# Patient Record
Sex: FEMALE | Race: WHITE | Employment: OTHER | URBAN - METROPOLITAN AREA
[De-identification: names, ages, dates, MRNs, and addresses within clinical notes are randomized per-mention and may not be internally consistent; named-entity substitution may affect disease eponyms.]

---

## 2017-06-02 RX ORDER — METHOCARBAMOL 750 MG/1
750 TABLET, FILM COATED ORAL 4 TIMES DAILY
Status: ON HOLD | COMMUNITY
End: 2021-10-22 | Stop reason: SDUPTHER

## 2017-06-02 RX ORDER — PANTOPRAZOLE SODIUM 40 MG/1
40 TABLET, DELAYED RELEASE ORAL DAILY
COMMUNITY

## 2017-06-02 RX ORDER — INSULIN GLARGINE 100 [IU]/ML
18 INJECTION, SOLUTION SUBCUTANEOUS
COMMUNITY
End: 2021-10-15

## 2017-06-02 RX ORDER — INDOMETHACIN 50 MG/1
50 CAPSULE ORAL 2 TIMES DAILY
COMMUNITY
End: 2018-05-03

## 2017-06-02 RX ORDER — PRAZOSIN HYDROCHLORIDE 5 MG/1
10 CAPSULE ORAL
COMMUNITY

## 2017-06-02 RX ORDER — GLUCOSAMINE SULFATE 1500 MG
1000 POWDER IN PACKET (EA) ORAL 2 TIMES DAILY
COMMUNITY

## 2017-06-02 RX ORDER — ALPRAZOLAM 0.25 MG/1
0.25 TABLET ORAL
COMMUNITY
End: 2018-05-03

## 2017-06-02 RX ORDER — ALPRAZOLAM 0.5 MG/1
0.5 TABLET ORAL
COMMUNITY
End: 2021-10-15

## 2017-06-02 RX ORDER — QUETIAPINE FUMARATE 200 MG/1
200 TABLET, FILM COATED ORAL
COMMUNITY
End: 2018-05-03

## 2017-06-02 RX ORDER — HYDROXYZINE PAMOATE 25 MG/1
25 CAPSULE ORAL
COMMUNITY
End: 2018-05-03

## 2017-06-02 RX ORDER — METFORMIN HYDROCHLORIDE 500 MG/1
500 TABLET ORAL 2 TIMES DAILY WITH MEALS
COMMUNITY
End: 2021-10-15

## 2017-06-02 RX ORDER — MEDROXYPROGESTERONE ACETATE 5 MG/1
5 TABLET ORAL 2 TIMES DAILY
COMMUNITY
End: 2017-06-09

## 2017-06-02 RX ORDER — CARBIDOPA AND LEVODOPA 25; 250 MG/1; MG/1
1 TABLET, ORALLY DISINTEGRATING ORAL
COMMUNITY
End: 2021-10-15

## 2017-06-02 RX ORDER — AMITRIPTYLINE HYDROCHLORIDE 50 MG/1
25 TABLET, FILM COATED ORAL
COMMUNITY
End: 2020-12-09

## 2017-06-02 RX ORDER — LORATADINE 10 MG/1
10 TABLET ORAL
COMMUNITY
End: 2017-06-09

## 2017-06-02 NOTE — PERIOP NOTES
TELEPHONE PRE-OP INTERVIEW: PRE-OP INSTRUCTIONS GIVEN, INCLUDING NPO AFTER MIDNIGHT, MEDICATIONS TO BRING DOS AND MEDICATIONS TO TAKE DOS. ALSO INCLUDED WERE MEDICATIONS TO STOP PRIOR TO SURGERY. PATIENT VOICED UNDERSTANDING OF SAME.

## 2017-06-08 ENCOUNTER — ANESTHESIA (OUTPATIENT)
Dept: SURGERY | Age: 47
End: 2017-06-08
Payer: OTHER GOVERNMENT

## 2017-06-08 ENCOUNTER — HOSPITAL ENCOUNTER (OUTPATIENT)
Age: 47
Setting detail: OBSERVATION
Discharge: HOME | End: 2017-06-09
Attending: OBSTETRICS & GYNECOLOGY | Admitting: OBSTETRICS & GYNECOLOGY
Payer: OTHER GOVERNMENT

## 2017-06-08 ENCOUNTER — ANESTHESIA EVENT (OUTPATIENT)
Dept: SURGERY | Age: 47
End: 2017-06-08
Payer: OTHER GOVERNMENT

## 2017-06-08 LAB
GLUCOSE BLD STRIP.AUTO-MCNC: 103 MG/DL (ref 65–100)
GLUCOSE BLD STRIP.AUTO-MCNC: 140 MG/DL (ref 65–100)
GLUCOSE BLD STRIP.AUTO-MCNC: 198 MG/DL (ref 65–100)
GLUCOSE BLD STRIP.AUTO-MCNC: 202 MG/DL (ref 65–100)
HCG UR QL: NEGATIVE
SERVICE CMNT-IMP: ABNORMAL

## 2017-06-08 PROCEDURE — 74011250636 HC RX REV CODE- 250/636: Performed by: OBSTETRICS & GYNECOLOGY

## 2017-06-08 PROCEDURE — 76010000881 HC OR TIME 4.5 TO 5HR INTENSV - TIER 2: Performed by: OBSTETRICS & GYNECOLOGY

## 2017-06-08 PROCEDURE — 77030035277 HC OBTRTR BLDELSS DISP INTU -B: Performed by: OBSTETRICS & GYNECOLOGY

## 2017-06-08 PROCEDURE — C1765 ADHESION BARRIER: HCPCS | Performed by: OBSTETRICS & GYNECOLOGY

## 2017-06-08 PROCEDURE — 77030020263 HC SOL INJ SOD CL0.9% LFCR 1000ML: Performed by: OBSTETRICS & GYNECOLOGY

## 2017-06-08 PROCEDURE — 77030032490 HC SLV COMPR SCD KNE COVD -B: Performed by: OBSTETRICS & GYNECOLOGY

## 2017-06-08 PROCEDURE — 77030008557 HC TBNG SMK EVAC STOR -B: Performed by: OBSTETRICS & GYNECOLOGY

## 2017-06-08 PROCEDURE — 77030011640 HC PAD GRND REM COVD -A: Performed by: OBSTETRICS & GYNECOLOGY

## 2017-06-08 PROCEDURE — 74011250636 HC RX REV CODE- 250/636: Performed by: ANESTHESIOLOGY

## 2017-06-08 PROCEDURE — 82962 GLUCOSE BLOOD TEST: CPT

## 2017-06-08 PROCEDURE — 77030008598 HC TRCR ENDOSC BLDLS J&J -B: Performed by: OBSTETRICS & GYNECOLOGY

## 2017-06-08 PROCEDURE — 74011250636 HC RX REV CODE- 250/636: Performed by: NURSE PRACTITIONER

## 2017-06-08 PROCEDURE — G0378 HOSPITAL OBSERVATION PER HR: HCPCS

## 2017-06-08 PROCEDURE — 74011250636 HC RX REV CODE- 250/636

## 2017-06-08 PROCEDURE — 77030008756 HC TU IRR SUC STRY -B: Performed by: OBSTETRICS & GYNECOLOGY

## 2017-06-08 PROCEDURE — 74011000250 HC RX REV CODE- 250: Performed by: ANESTHESIOLOGY

## 2017-06-08 PROCEDURE — 77030019927 HC TBNG IRR CYSTO BAXT -A: Performed by: OBSTETRICS & GYNECOLOGY

## 2017-06-08 PROCEDURE — 77030008684 HC TU ET CUF COVD -B: Performed by: ANESTHESIOLOGY

## 2017-06-08 PROCEDURE — 77030018836 HC SOL IRR NACL ICUM -A: Performed by: OBSTETRICS & GYNECOLOGY

## 2017-06-08 PROCEDURE — 74011000250 HC RX REV CODE- 250: Performed by: OBSTETRICS & GYNECOLOGY

## 2017-06-08 PROCEDURE — 77030005518 HC CATH URETH FOL 2W BARD -B: Performed by: OBSTETRICS & GYNECOLOGY

## 2017-06-08 PROCEDURE — 88307 TISSUE EXAM BY PATHOLOGIST: CPT | Performed by: OBSTETRICS & GYNECOLOGY

## 2017-06-08 PROCEDURE — 74011000250 HC RX REV CODE- 250

## 2017-06-08 PROCEDURE — 77030033271 HC TRCR ENDOSC EPATH2 J&J -B: Performed by: OBSTETRICS & GYNECOLOGY

## 2017-06-08 PROCEDURE — 77030018832 HC SOL IRR H20 ICUM -A: Performed by: OBSTETRICS & GYNECOLOGY

## 2017-06-08 PROCEDURE — 77030002925 HC SUT GORTX WLGO -C: Performed by: OBSTETRICS & GYNECOLOGY

## 2017-06-08 PROCEDURE — 77030013079 HC BLNKT BAIR HGGR 3M -A: Performed by: ANESTHESIOLOGY

## 2017-06-08 PROCEDURE — 77030037021 HC SLNG PELV MSH Y SHP BLU UPSYLON BSC -G: Performed by: OBSTETRICS & GYNECOLOGY

## 2017-06-08 PROCEDURE — 77030031139 HC SUT VCRL2 J&J -A: Performed by: OBSTETRICS & GYNECOLOGY

## 2017-06-08 PROCEDURE — 77030016151 HC PROTCTR LNS DFOG COVD -B: Performed by: OBSTETRICS & GYNECOLOGY

## 2017-06-08 PROCEDURE — 74011250637 HC RX REV CODE- 250/637: Performed by: NURSE PRACTITIONER

## 2017-06-08 PROCEDURE — 74011636637 HC RX REV CODE- 636/637: Performed by: NURSE PRACTITIONER

## 2017-06-08 PROCEDURE — 77030003580 HC NDL INSUF VERES J&J -B: Performed by: OBSTETRICS & GYNECOLOGY

## 2017-06-08 PROCEDURE — 76060000040 HC ANESTHESIA 4.5 TO 5 HR: Performed by: OBSTETRICS & GYNECOLOGY

## 2017-06-08 PROCEDURE — 77030002933 HC SUT MCRYL J&J -A: Performed by: OBSTETRICS & GYNECOLOGY

## 2017-06-08 PROCEDURE — 77030026438 HC STYL ET INTUB CARD -A: Performed by: ANESTHESIOLOGY

## 2017-06-08 PROCEDURE — 81025 URINE PREGNANCY TEST: CPT

## 2017-06-08 PROCEDURE — 99218 HC RM OBSERVATION: CPT

## 2017-06-08 PROCEDURE — C1771 REP DEV, URINARY, W/SLING: HCPCS | Performed by: OBSTETRICS & GYNECOLOGY

## 2017-06-08 PROCEDURE — 77030008771 HC TU NG SALEM SUMP -A: Performed by: ANESTHESIOLOGY

## 2017-06-08 PROCEDURE — 76210000017 HC OR PH I REC 1.5 TO 2 HR: Performed by: OBSTETRICS & GYNECOLOGY

## 2017-06-08 PROCEDURE — 77030020782 HC GWN BAIR PAWS FLX 3M -B

## 2017-06-08 PROCEDURE — 77030035488 HC SEAL UNIV DISP INTU -C: Performed by: OBSTETRICS & GYNECOLOGY

## 2017-06-08 PROCEDURE — 77030019908 HC STETH ESOPH SIMS -A: Performed by: ANESTHESIOLOGY

## 2017-06-08 PROCEDURE — 77030033202 HC SUT CAPRYSN COVD -A: Performed by: OBSTETRICS & GYNECOLOGY

## 2017-06-08 PROCEDURE — 74011250637 HC RX REV CODE- 250/637: Performed by: OBSTETRICS & GYNECOLOGY

## 2017-06-08 PROCEDURE — 77030010507 HC ADH SKN DERMBND J&J -B: Performed by: OBSTETRICS & GYNECOLOGY

## 2017-06-08 DEVICE — SLING FOR FEMALE INCONTINENCE
Type: IMPLANTABLE DEVICE | Site: PELVIS | Status: FUNCTIONAL
Brand: I-STOP

## 2017-06-08 DEVICE — TRADITIONAL Y MESH
Type: IMPLANTABLE DEVICE | Site: PELVIS | Status: FUNCTIONAL
Brand: UPSYLON™

## 2017-06-08 RX ORDER — FENTANYL CITRATE 50 UG/ML
50 INJECTION, SOLUTION INTRAMUSCULAR; INTRAVENOUS AS NEEDED
Status: DISCONTINUED | OUTPATIENT
Start: 2017-06-08 | End: 2017-06-08 | Stop reason: HOSPADM

## 2017-06-08 RX ORDER — SODIUM CHLORIDE 0.9 % (FLUSH) 0.9 %
5-10 SYRINGE (ML) INJECTION AS NEEDED
Status: DISCONTINUED | OUTPATIENT
Start: 2017-06-08 | End: 2017-06-08 | Stop reason: HOSPADM

## 2017-06-08 RX ORDER — DEXTROSE 50 % IN WATER (D50W) INTRAVENOUS SYRINGE
12.5-25 AS NEEDED
Status: DISCONTINUED | OUTPATIENT
Start: 2017-06-08 | End: 2017-06-08 | Stop reason: RX

## 2017-06-08 RX ORDER — SODIUM CHLORIDE 0.9 % (FLUSH) 0.9 %
5-10 SYRINGE (ML) INJECTION EVERY 8 HOURS
Status: DISCONTINUED | OUTPATIENT
Start: 2017-06-08 | End: 2017-06-09 | Stop reason: HOSPADM

## 2017-06-08 RX ORDER — OXYCODONE HYDROCHLORIDE 5 MG/1
5 TABLET ORAL AS NEEDED
Status: DISCONTINUED | OUTPATIENT
Start: 2017-06-08 | End: 2017-06-08 | Stop reason: HOSPADM

## 2017-06-08 RX ORDER — SODIUM CHLORIDE, SODIUM LACTATE, POTASSIUM CHLORIDE, CALCIUM CHLORIDE 600; 310; 30; 20 MG/100ML; MG/100ML; MG/100ML; MG/100ML
125 INJECTION, SOLUTION INTRAVENOUS CONTINUOUS
Status: DISCONTINUED | OUTPATIENT
Start: 2017-06-08 | End: 2017-06-09 | Stop reason: HOSPADM

## 2017-06-08 RX ORDER — ONDANSETRON 2 MG/ML
4 INJECTION INTRAMUSCULAR; INTRAVENOUS
Status: DISCONTINUED | OUTPATIENT
Start: 2017-06-08 | End: 2017-06-09 | Stop reason: HOSPADM

## 2017-06-08 RX ORDER — HYDROXYZINE 25 MG/1
25 TABLET, FILM COATED ORAL DAILY
Status: DISCONTINUED | OUTPATIENT
Start: 2017-06-09 | End: 2017-06-09 | Stop reason: HOSPADM

## 2017-06-08 RX ORDER — SODIUM CHLORIDE 9 MG/ML
25 INJECTION, SOLUTION INTRAVENOUS CONTINUOUS
Status: DISCONTINUED | OUTPATIENT
Start: 2017-06-08 | End: 2017-06-08 | Stop reason: HOSPADM

## 2017-06-08 RX ORDER — CARBIDOPA AND LEVODOPA 25; 100 MG/1; MG/1
1 TABLET, ORALLY DISINTEGRATING ORAL
Status: DISCONTINUED | OUTPATIENT
Start: 2017-06-08 | End: 2017-06-09 | Stop reason: HOSPADM

## 2017-06-08 RX ORDER — ROCURONIUM BROMIDE 10 MG/ML
INJECTION, SOLUTION INTRAVENOUS AS NEEDED
Status: DISCONTINUED | OUTPATIENT
Start: 2017-06-08 | End: 2017-06-08 | Stop reason: HOSPADM

## 2017-06-08 RX ORDER — DIPHENHYDRAMINE HYDROCHLORIDE 50 MG/ML
12.5 INJECTION, SOLUTION INTRAMUSCULAR; INTRAVENOUS
Status: DISCONTINUED | OUTPATIENT
Start: 2017-06-08 | End: 2017-06-09 | Stop reason: HOSPADM

## 2017-06-08 RX ORDER — DULOXETIN HYDROCHLORIDE 30 MG/1
90 CAPSULE, DELAYED RELEASE ORAL
Status: DISCONTINUED | OUTPATIENT
Start: 2017-06-08 | End: 2017-06-09 | Stop reason: HOSPADM

## 2017-06-08 RX ORDER — DEXTROSE MONOHYDRATE 100 MG/ML
125-250 INJECTION, SOLUTION INTRAVENOUS AS NEEDED
Status: DISCONTINUED | OUTPATIENT
Start: 2017-06-08 | End: 2017-06-09 | Stop reason: HOSPADM

## 2017-06-08 RX ORDER — DOCUSATE SODIUM 100 MG/1
100 CAPSULE, LIQUID FILLED ORAL 2 TIMES DAILY
Status: DISCONTINUED | OUTPATIENT
Start: 2017-06-09 | End: 2017-06-09 | Stop reason: HOSPADM

## 2017-06-08 RX ORDER — GLYCOPYRROLATE 0.2 MG/ML
INJECTION INTRAMUSCULAR; INTRAVENOUS AS NEEDED
Status: DISCONTINUED | OUTPATIENT
Start: 2017-06-08 | End: 2017-06-08 | Stop reason: HOSPADM

## 2017-06-08 RX ORDER — GABAPENTIN 600 MG/1
1200 TABLET ORAL 2 TIMES DAILY
Status: DISCONTINUED | OUTPATIENT
Start: 2017-06-09 | End: 2017-06-09 | Stop reason: HOSPADM

## 2017-06-08 RX ORDER — MIDAZOLAM HYDROCHLORIDE 1 MG/ML
0.5 INJECTION, SOLUTION INTRAMUSCULAR; INTRAVENOUS
Status: DISCONTINUED | OUTPATIENT
Start: 2017-06-08 | End: 2017-06-08 | Stop reason: HOSPADM

## 2017-06-08 RX ORDER — LIDOCAINE HYDROCHLORIDE 20 MG/ML
INJECTION, SOLUTION EPIDURAL; INFILTRATION; INTRACAUDAL; PERINEURAL AS NEEDED
Status: DISCONTINUED | OUTPATIENT
Start: 2017-06-08 | End: 2017-06-08 | Stop reason: HOSPADM

## 2017-06-08 RX ORDER — NEOSTIGMINE METHYLSULFATE 1 MG/ML
INJECTION INTRAVENOUS AS NEEDED
Status: DISCONTINUED | OUTPATIENT
Start: 2017-06-08 | End: 2017-06-08 | Stop reason: HOSPADM

## 2017-06-08 RX ORDER — FENTANYL CITRATE 50 UG/ML
INJECTION, SOLUTION INTRAMUSCULAR; INTRAVENOUS AS NEEDED
Status: DISCONTINUED | OUTPATIENT
Start: 2017-06-08 | End: 2017-06-08 | Stop reason: HOSPADM

## 2017-06-08 RX ORDER — PRAZOSIN HYDROCHLORIDE 5 MG/1
5 CAPSULE ORAL
Status: DISCONTINUED | OUTPATIENT
Start: 2017-06-08 | End: 2017-06-09 | Stop reason: HOSPADM

## 2017-06-08 RX ORDER — BUPIVACAINE HYDROCHLORIDE AND EPINEPHRINE 5; 5 MG/ML; UG/ML
INJECTION, SOLUTION EPIDURAL; INTRACAUDAL; PERINEURAL AS NEEDED
Status: DISCONTINUED | OUTPATIENT
Start: 2017-06-08 | End: 2017-06-08 | Stop reason: HOSPADM

## 2017-06-08 RX ORDER — LIDOCAINE HYDROCHLORIDE 10 MG/ML
0.1 INJECTION, SOLUTION EPIDURAL; INFILTRATION; INTRACAUDAL; PERINEURAL AS NEEDED
Status: DISCONTINUED | OUTPATIENT
Start: 2017-06-08 | End: 2017-06-08 | Stop reason: HOSPADM

## 2017-06-08 RX ORDER — FUROSEMIDE 10 MG/ML
INJECTION INTRAMUSCULAR; INTRAVENOUS AS NEEDED
Status: DISCONTINUED | OUTPATIENT
Start: 2017-06-08 | End: 2017-06-08 | Stop reason: HOSPADM

## 2017-06-08 RX ORDER — MIDAZOLAM HYDROCHLORIDE 1 MG/ML
INJECTION, SOLUTION INTRAMUSCULAR; INTRAVENOUS AS NEEDED
Status: DISCONTINUED | OUTPATIENT
Start: 2017-06-08 | End: 2017-06-08 | Stop reason: HOSPADM

## 2017-06-08 RX ORDER — ROPIVACAINE HYDROCHLORIDE 5 MG/ML
30 INJECTION, SOLUTION EPIDURAL; INFILTRATION; PERINEURAL AS NEEDED
Status: DISCONTINUED | OUTPATIENT
Start: 2017-06-08 | End: 2017-06-08 | Stop reason: HOSPADM

## 2017-06-08 RX ORDER — NALOXONE HYDROCHLORIDE 0.4 MG/ML
0.4 INJECTION, SOLUTION INTRAMUSCULAR; INTRAVENOUS; SUBCUTANEOUS AS NEEDED
Status: DISCONTINUED | OUTPATIENT
Start: 2017-06-08 | End: 2017-06-09 | Stop reason: HOSPADM

## 2017-06-08 RX ORDER — HYDROMORPHONE HYDROCHLORIDE 1 MG/ML
0.2 INJECTION, SOLUTION INTRAMUSCULAR; INTRAVENOUS; SUBCUTANEOUS
Status: DISCONTINUED | OUTPATIENT
Start: 2017-06-08 | End: 2017-06-08 | Stop reason: HOSPADM

## 2017-06-08 RX ORDER — SODIUM CHLORIDE, SODIUM LACTATE, POTASSIUM CHLORIDE, CALCIUM CHLORIDE 600; 310; 30; 20 MG/100ML; MG/100ML; MG/100ML; MG/100ML
100 INJECTION, SOLUTION INTRAVENOUS CONTINUOUS
Status: DISCONTINUED | OUTPATIENT
Start: 2017-06-08 | End: 2017-06-08 | Stop reason: HOSPADM

## 2017-06-08 RX ORDER — HYDROMORPHONE HYDROCHLORIDE 1 MG/ML
INJECTION, SOLUTION INTRAMUSCULAR; INTRAVENOUS; SUBCUTANEOUS AS NEEDED
Status: DISCONTINUED | OUTPATIENT
Start: 2017-06-08 | End: 2017-06-08 | Stop reason: HOSPADM

## 2017-06-08 RX ORDER — ONDANSETRON 2 MG/ML
INJECTION INTRAMUSCULAR; INTRAVENOUS AS NEEDED
Status: DISCONTINUED | OUTPATIENT
Start: 2017-06-08 | End: 2017-06-08 | Stop reason: HOSPADM

## 2017-06-08 RX ORDER — MORPHINE SULFATE 10 MG/ML
2 INJECTION, SOLUTION INTRAMUSCULAR; INTRAVENOUS
Status: DISCONTINUED | OUTPATIENT
Start: 2017-06-08 | End: 2017-06-08 | Stop reason: HOSPADM

## 2017-06-08 RX ORDER — FAMOTIDINE 20 MG/1
20 TABLET, FILM COATED ORAL 2 TIMES DAILY
Status: DISCONTINUED | OUTPATIENT
Start: 2017-06-08 | End: 2017-06-09 | Stop reason: HOSPADM

## 2017-06-08 RX ORDER — SODIUM CHLORIDE, SODIUM LACTATE, POTASSIUM CHLORIDE, CALCIUM CHLORIDE 600; 310; 30; 20 MG/100ML; MG/100ML; MG/100ML; MG/100ML
25 INJECTION, SOLUTION INTRAVENOUS CONTINUOUS
Status: DISCONTINUED | OUTPATIENT
Start: 2017-06-08 | End: 2017-06-08 | Stop reason: HOSPADM

## 2017-06-08 RX ORDER — CEFAZOLIN SODIUM IN 0.9 % NACL 2 G/100 ML
PLASTIC BAG, INJECTION (ML) INTRAVENOUS AS NEEDED
Status: DISCONTINUED | OUTPATIENT
Start: 2017-06-08 | End: 2017-06-08 | Stop reason: HOSPADM

## 2017-06-08 RX ORDER — INSULIN LISPRO 100 [IU]/ML
2 INJECTION, SOLUTION INTRAVENOUS; SUBCUTANEOUS ONCE
Status: COMPLETED | OUTPATIENT
Start: 2017-06-08 | End: 2017-06-08

## 2017-06-08 RX ORDER — SODIUM CHLORIDE 0.9 % (FLUSH) 0.9 %
5-10 SYRINGE (ML) INJECTION EVERY 8 HOURS
Status: DISCONTINUED | OUTPATIENT
Start: 2017-06-08 | End: 2017-06-08 | Stop reason: HOSPADM

## 2017-06-08 RX ORDER — LIDOCAINE HYDROCHLORIDE AND EPINEPHRINE 5; 5 MG/ML; UG/ML
INJECTION, SOLUTION INFILTRATION; PERINEURAL AS NEEDED
Status: DISCONTINUED | OUTPATIENT
Start: 2017-06-08 | End: 2017-06-08 | Stop reason: HOSPADM

## 2017-06-08 RX ORDER — METRONIDAZOLE 7.5 MG/G
GEL VAGINAL AS NEEDED
Status: DISCONTINUED | OUTPATIENT
Start: 2017-06-08 | End: 2017-06-08 | Stop reason: HOSPADM

## 2017-06-08 RX ORDER — HYDROMORPHONE HCL IN 0.9% NACL 15 MG/30ML
PATIENT CONTROLLED ANALGESIA VIAL INTRAVENOUS CONTINUOUS
Status: DISCONTINUED | OUTPATIENT
Start: 2017-06-08 | End: 2017-06-09

## 2017-06-08 RX ORDER — PROPOFOL 10 MG/ML
INJECTION, EMULSION INTRAVENOUS AS NEEDED
Status: DISCONTINUED | OUTPATIENT
Start: 2017-06-08 | End: 2017-06-08 | Stop reason: HOSPADM

## 2017-06-08 RX ORDER — ONDANSETRON 2 MG/ML
4 INJECTION INTRAMUSCULAR; INTRAVENOUS AS NEEDED
Status: DISCONTINUED | OUTPATIENT
Start: 2017-06-08 | End: 2017-06-08 | Stop reason: HOSPADM

## 2017-06-08 RX ORDER — DIPHENHYDRAMINE HYDROCHLORIDE 50 MG/ML
12.5 INJECTION, SOLUTION INTRAMUSCULAR; INTRAVENOUS AS NEEDED
Status: DISCONTINUED | OUTPATIENT
Start: 2017-06-08 | End: 2017-06-08 | Stop reason: HOSPADM

## 2017-06-08 RX ORDER — MIDAZOLAM HYDROCHLORIDE 1 MG/ML
1 INJECTION, SOLUTION INTRAMUSCULAR; INTRAVENOUS AS NEEDED
Status: DISCONTINUED | OUTPATIENT
Start: 2017-06-08 | End: 2017-06-08 | Stop reason: HOSPADM

## 2017-06-08 RX ORDER — INSULIN LISPRO 100 [IU]/ML
INJECTION, SOLUTION INTRAVENOUS; SUBCUTANEOUS
Status: DISCONTINUED | OUTPATIENT
Start: 2017-06-08 | End: 2017-06-09 | Stop reason: HOSPADM

## 2017-06-08 RX ORDER — SUCCINYLCHOLINE CHLORIDE 20 MG/ML
INJECTION INTRAMUSCULAR; INTRAVENOUS AS NEEDED
Status: DISCONTINUED | OUTPATIENT
Start: 2017-06-08 | End: 2017-06-08 | Stop reason: HOSPADM

## 2017-06-08 RX ORDER — SODIUM CHLORIDE 0.9 % (FLUSH) 0.9 %
5-10 SYRINGE (ML) INJECTION AS NEEDED
Status: DISCONTINUED | OUTPATIENT
Start: 2017-06-08 | End: 2017-06-09 | Stop reason: HOSPADM

## 2017-06-08 RX ORDER — FENTANYL CITRATE 50 UG/ML
25 INJECTION, SOLUTION INTRAMUSCULAR; INTRAVENOUS
Status: COMPLETED | OUTPATIENT
Start: 2017-06-08 | End: 2017-06-08

## 2017-06-08 RX ORDER — MAGNESIUM SULFATE 100 %
4 CRYSTALS MISCELLANEOUS AS NEEDED
Status: DISCONTINUED | OUTPATIENT
Start: 2017-06-08 | End: 2017-06-09 | Stop reason: HOSPADM

## 2017-06-08 RX ORDER — HYDROXYZINE 25 MG/1
75 TABLET, FILM COATED ORAL
Status: DISCONTINUED | OUTPATIENT
Start: 2017-06-08 | End: 2017-06-09 | Stop reason: HOSPADM

## 2017-06-08 RX ADMIN — ROCURONIUM BROMIDE 5 MG: 10 INJECTION, SOLUTION INTRAVENOUS at 09:21

## 2017-06-08 RX ADMIN — SODIUM CHLORIDE, SODIUM LACTATE, POTASSIUM CHLORIDE, AND CALCIUM CHLORIDE 125 ML/HR: 600; 310; 30; 20 INJECTION, SOLUTION INTRAVENOUS at 15:41

## 2017-06-08 RX ADMIN — HYDROMORPHONE HYDROCHLORIDE 0.5 MG: 1 INJECTION, SOLUTION INTRAMUSCULAR; INTRAVENOUS; SUBCUTANEOUS at 15:11

## 2017-06-08 RX ADMIN — HYDROMORPHONE HYDROCHLORIDE 0.5 MG: 1 INJECTION, SOLUTION INTRAMUSCULAR; INTRAVENOUS; SUBCUTANEOUS at 10:27

## 2017-06-08 RX ADMIN — SODIUM CHLORIDE, SODIUM LACTATE, POTASSIUM CHLORIDE, AND CALCIUM CHLORIDE 25 ML/HR: 600; 310; 30; 20 INJECTION, SOLUTION INTRAVENOUS at 08:50

## 2017-06-08 RX ADMIN — FENTANYL CITRATE 50 MCG: 50 INJECTION, SOLUTION INTRAMUSCULAR; INTRAVENOUS at 09:09

## 2017-06-08 RX ADMIN — ROCURONIUM BROMIDE 30 MG: 10 INJECTION, SOLUTION INTRAVENOUS at 10:52

## 2017-06-08 RX ADMIN — Medication 10 ML: at 17:00

## 2017-06-08 RX ADMIN — FENTANYL CITRATE 50 MCG: 50 INJECTION, SOLUTION INTRAMUSCULAR; INTRAVENOUS at 11:51

## 2017-06-08 RX ADMIN — FENTANYL CITRATE 25 MCG: 50 INJECTION, SOLUTION INTRAMUSCULAR; INTRAVENOUS at 14:45

## 2017-06-08 RX ADMIN — MEPERIDINE HYDROCHLORIDE 25 MG: 25 INJECTION INTRAMUSCULAR; INTRAVENOUS; SUBCUTANEOUS at 14:23

## 2017-06-08 RX ADMIN — Medication: at 15:16

## 2017-06-08 RX ADMIN — SODIUM CHLORIDE, SODIUM LACTATE, POTASSIUM CHLORIDE, AND CALCIUM CHLORIDE: 600; 310; 30; 20 INJECTION, SOLUTION INTRAVENOUS at 12:25

## 2017-06-08 RX ADMIN — DIPHENHYDRAMINE HYDROCHLORIDE 12.5 MG: 50 INJECTION, SOLUTION INTRAMUSCULAR; INTRAVENOUS at 17:12

## 2017-06-08 RX ADMIN — LIDOCAINE HYDROCHLORIDE 80 MG: 20 INJECTION, SOLUTION EPIDURAL; INFILTRATION; INTRACAUDAL; PERINEURAL at 09:19

## 2017-06-08 RX ADMIN — ROCURONIUM BROMIDE 35 MG: 10 INJECTION, SOLUTION INTRAVENOUS at 09:40

## 2017-06-08 RX ADMIN — FENTANYL CITRATE 25 MCG: 50 INJECTION, SOLUTION INTRAMUSCULAR; INTRAVENOUS at 14:30

## 2017-06-08 RX ADMIN — HYDROMORPHONE HYDROCHLORIDE 0.25 MG: 1 INJECTION, SOLUTION INTRAMUSCULAR; INTRAVENOUS; SUBCUTANEOUS at 13:18

## 2017-06-08 RX ADMIN — HYDROMORPHONE HYDROCHLORIDE 0.25 MG: 1 INJECTION, SOLUTION INTRAMUSCULAR; INTRAVENOUS; SUBCUTANEOUS at 12:11

## 2017-06-08 RX ADMIN — FUROSEMIDE 10 MG: 10 INJECTION INTRAMUSCULAR; INTRAVENOUS at 12:58

## 2017-06-08 RX ADMIN — FENTANYL CITRATE 50 MCG: 50 INJECTION, SOLUTION INTRAMUSCULAR; INTRAVENOUS at 09:21

## 2017-06-08 RX ADMIN — FENTANYL CITRATE 50 MCG: 50 INJECTION, SOLUTION INTRAMUSCULAR; INTRAVENOUS at 10:22

## 2017-06-08 RX ADMIN — SUCCINYLCHOLINE CHLORIDE 160 MG: 20 INJECTION INTRAMUSCULAR; INTRAVENOUS at 09:21

## 2017-06-08 RX ADMIN — FENTANYL CITRATE 50 MCG: 50 INJECTION, SOLUTION INTRAMUSCULAR; INTRAVENOUS at 13:59

## 2017-06-08 RX ADMIN — FENTANYL CITRATE 25 MCG: 50 INJECTION, SOLUTION INTRAMUSCULAR; INTRAVENOUS at 14:22

## 2017-06-08 RX ADMIN — FENTANYL CITRATE 50 MCG: 50 INJECTION, SOLUTION INTRAMUSCULAR; INTRAVENOUS at 10:30

## 2017-06-08 RX ADMIN — PROPOFOL 150 MG: 10 INJECTION, EMULSION INTRAVENOUS at 09:21

## 2017-06-08 RX ADMIN — SODIUM CHLORIDE, SODIUM LACTATE, POTASSIUM CHLORIDE, AND CALCIUM CHLORIDE 25 ML/HR: 600; 310; 30; 20 INJECTION, SOLUTION INTRAVENOUS at 09:00

## 2017-06-08 RX ADMIN — ROCURONIUM BROMIDE 5 MG: 10 INJECTION, SOLUTION INTRAVENOUS at 11:51

## 2017-06-08 RX ADMIN — FENTANYL CITRATE 25 MCG: 50 INJECTION, SOLUTION INTRAMUSCULAR; INTRAVENOUS at 15:00

## 2017-06-08 RX ADMIN — Medication 10 ML: at 22:00

## 2017-06-08 RX ADMIN — GLYCOPYRROLATE 0.6 MG: 0.2 INJECTION INTRAMUSCULAR; INTRAVENOUS at 13:29

## 2017-06-08 RX ADMIN — ONDANSETRON 4 MG: 2 INJECTION INTRAMUSCULAR; INTRAVENOUS at 13:00

## 2017-06-08 RX ADMIN — DULOXETINE HYDROCHLORIDE 90 MG: 30 CAPSULE, DELAYED RELEASE ORAL at 23:35

## 2017-06-08 RX ADMIN — FAMOTIDINE 20 MG: 20 TABLET ORAL at 23:34

## 2017-06-08 RX ADMIN — NEOSTIGMINE METHYLSULFATE 4 MG: 1 INJECTION INTRAVENOUS at 13:29

## 2017-06-08 RX ADMIN — LIDOCAINE HYDROCHLORIDE 0.1 ML: 10 INJECTION, SOLUTION EPIDURAL; INFILTRATION; INTRACAUDAL; PERINEURAL at 08:50

## 2017-06-08 RX ADMIN — ROCURONIUM BROMIDE 10 MG: 10 INJECTION, SOLUTION INTRAVENOUS at 10:22

## 2017-06-08 RX ADMIN — INSULIN LISPRO 2 UNITS: 100 INJECTION, SOLUTION INTRAVENOUS; SUBCUTANEOUS at 18:35

## 2017-06-08 RX ADMIN — MIDAZOLAM HYDROCHLORIDE 3 MG: 1 INJECTION, SOLUTION INTRAMUSCULAR; INTRAVENOUS at 09:09

## 2017-06-08 RX ADMIN — ROCURONIUM BROMIDE 10 MG: 10 INJECTION, SOLUTION INTRAVENOUS at 12:32

## 2017-06-08 RX ADMIN — MIDAZOLAM HYDROCHLORIDE 2 MG: 1 INJECTION, SOLUTION INTRAMUSCULAR; INTRAVENOUS at 09:13

## 2017-06-08 RX ADMIN — HYDROXYZINE HYDROCHLORIDE 75 MG: 25 TABLET, FILM COATED ORAL at 23:35

## 2017-06-08 RX ADMIN — Medication 2 G: at 10:20

## 2017-06-08 NOTE — PERIOP NOTES
TRANSFER - OUT REPORT:  s  Verbal report given to [facility] on Eveline Calderon  being transferred to [facility] for routine post - op       Report consisted of patients Situation, Background, Assessment and   Recommendations(SBAR). Time Pre op antibiotic given:See MAR  Anesthesia Stop time: [id number]  Greene Present on Transfer to floor: Maxine Greene on Chart:Ye    Information from the following report(s) SBAR, OR Summary, Procedure Summary, Intake/Output and MAR was reviewed with the receiving nurse. Opportunity for questions and clarification was provided. Is the patient on 02? NO       L/Min RA       Other None    Is the patient on a monitor? NO    Is the nurse transporting with the patient? NO    Surgical Waiting Area notified of patient's transfer from PACU?  (Pt said:  She has no Family Waiting); but I will call to clarify.       The following personal items collected during your admission accompanied patient upon transfer:   Dental Appliance: Dental Appliances: Partials, Uppers (partials removed by pt)  Vision: Visual Aid: Glasses  Hearing Aid:    Jewelry:    Clothing: Clothing:  (small luggage bag, blanket, clothing, glasses to pacu; large pillow to OR w/ pt)  Other Valuables:    Valuables sent to safe:

## 2017-06-08 NOTE — IP AVS SNAPSHOT
[address] 
[phone] Patient: Panda Warren MRN: [id number] WRN:[id number] You are allergic to the following Allergen Reactions Wellbutrin (Bupropion Hcl) Seizures Sulfa (Sulfonamide Antibiotics) Hives Recent Documentation Height Weight BMI OB Status Smoking Status 1.702 m 106.6 kg 36.81 kg/m2 Having regular periods Never Smoker Unresulted Labs Order Current Status SAMPLE TO BLOOD BANK In process Emergency Contacts Name Discharge Info Relation Home Work Mobile Danyell Lenz DISCHARGE CAREGIVER [3] Mother [14]   [phone] About your hospitalization You were admitted on:  June 8, 2017 You last received care in the:  [address] You were discharged on:  June 9, 2017 Unit phone number:  [phone] Why you were hospitalized Your primary diagnosis was:  Not on File Providers Seen During Your Hospitalizations Provider Role Specialty Primary office phone Rodrigo Kirkpatrick MD Attending Provider Obstetrics & Gynecology [phone] Your Primary Care Physician (PCP) Primary Care Physician Office Phone Office Fax OTHER, PHYS ** None ** ** None ** Follow-up Information Follow up With Details Comments Contact Info Sofie Rebolledo MD   Patient can only remember the practice name and not the physician Current Discharge Medication List  
  
START taking these medications Dose & Instructions Dispensing Information Comments Morning Noon Evening Bedtime  
 docusate sodium 100 mg capsule Commonly known as:  Tamar Poole Your last dose was: Your next dose is:    
   
   
 Dose:  100 mg Take 1 Cap by mouth two (2) times a day. Quantity:  90 Cap Refills:  0 HYDROcodone-acetaminophen  mg tablet Commonly known as:  Kenzie Buchanan  
 Your last dose was: Your next dose is:    
   
   
 Dose:  1-2 Tab Take 1-2 Tabs by mouth every four (4) hours as needed. Max Daily Amount: 12 Tabs. Quantity:  60 Tab Refills:  0  
     
   
   
   
  
 lactulose 20 gram/30 mL Soln solution Commonly known as:  Gabe Ilia Your last dose was: Your next dose is:    
   
   
 Dose:  20 g Take 30 mL by mouth three (3) times daily as needed. Quantity:  1 Bottle Refills:  1  
     
   
   
   
  
 nitrofurantoin (macrocrystal-monohydrate) 100 mg capsule Commonly known as:  MACROBID Your last dose was: Your next dose is:    
   
   
 Dose:  100 mg Take 1 Cap by mouth daily. Quantity:  10 Cap Refills:  0  
     
   
   
   
  
 ondansetron 8 mg disintegrating tablet Commonly known as:  ZOFRAN ODT Your last dose was: Your next dose is:    
   
   
 Dose:  8 mg Take 1 Tab by mouth every eight (8) hours as needed for Nausea. Quantity:  12 Tab Refills:  0 CONTINUE these medications which have NOT CHANGED Dose & Instructions Dispensing Information Comments Morning Noon Evening Bedtime * ALPRAZolam 0.5 mg tablet Commonly known as:  Juan R Arevalo Your last dose was: Your next dose is:    
   
   
 Dose:  0.5 mg Take 0.5 mg by mouth nightly. Refills:  0  
     
   
   
   
  
 * ALPRAZolam 0.25 mg tablet Commonly known as:  Juan R Arevalo Your last dose was: Your next dose is:    
   
   
 Dose:  0.25 mg Take 0.25 mg by mouth every morning. Refills:  0  
     
   
   
   
  
 amitriptyline 50 mg tablet Commonly known as:  ELAVIL Your last dose was: Your next dose is:    
   
   
 Dose:  50 mg Take 50 mg by mouth nightly. Refills:  0  
     
   
   
   
  
 carbidopa-levodopa  mg rapid dissolve tablet Commonly known as:  PARCOPA Your last dose was: Your next dose is: Dose:  1 Tab Take 1 Tab by mouth nightly. Refills:  0  
     
   
   
   
  
 DULOXETINE PO Your last dose was: Your next dose is:    
   
   
 Dose:  90 mg Take 90 mg by mouth nightly. Refills:  0  
     
   
   
   
  
 GABAPENTIN PO Your last dose was: Your next dose is:    
   
   
 Dose:  1200 mg Take 1,200 mg by mouth two (2) times a day. Refills:  0  
     
   
   
   
  
 indomethacin 50 mg capsule Commonly known as:  INDOCIN Your last dose was: Your next dose is:    
   
   
 Dose:  50 mg Take 50 mg by mouth two (2) times a day. Refills:  0  
     
   
   
   
  
 LANTUS 100 unit/mL injection Generic drug:  insulin glargine Your last dose was: Your next dose is:    
   
   
 Dose:  18 Units 18 Units by SubCUTAneous route every morning. Refills:  0  
     
   
   
   
  
 metFORMIN 500 mg tablet Commonly known as:  GLUCOPHAGE Your last dose was: Your next dose is:    
   
   
 Dose:  500 mg Take 500 mg by mouth two (2) times daily (with meals). Refills:  0  
     
   
   
   
  
 methocarbamol 750 mg tablet Commonly known as:  ROBAXIN Your last dose was: Your next dose is:    
   
   
 Dose:  750 mg Take 750 mg by mouth three (3) times daily. Refills:  0  
     
   
   
   
  
 pantoprazole 40 mg tablet Commonly known as:  PROTONIX Your last dose was: Your next dose is:    
   
   
 Dose:  40 mg Take 40 mg by mouth daily. Refills:  0  
     
   
   
   
  
 prazosin 5 mg capsule Commonly known as:  MINIPRESS Your last dose was: Your next dose is:    
   
   
 Dose:  5 mg Take 5 mg by mouth nightly. Refills:  0  
     
   
   
   
  
 RANITIDINE HCL PO Your last dose was: Your next dose is:    
   
   
 Dose:  150 mg Take 150 mg by mouth two (2) times a day. Refills:  0 SEROquel 200 mg tablet Generic drug:  QUEtiapine Your last dose was: Your next dose is:    
   
   
 Dose:  200 mg Take 200 mg by mouth nightly. Refills:  0  
     
   
   
   
  
 * VISTARIL 25 mg capsule Generic drug:  hydrOXYzine pamoate Your last dose was: Your next dose is:    
   
   
 Dose:  25 mg Take 25 mg by mouth every morning. Refills:  0  
     
   
   
   
  
 * VISTARIL PO Your last dose was: Your next dose is:    
   
   
 Dose:  75 mg Take 75 mg by mouth nightly. Refills:  0  
     
   
   
   
  
 VITAMIN D3 1,000 unit Cap Generic drug:  cholecalciferol Your last dose was: Your next dose is:    
   
   
 Dose:  1000 Units Take 1,000 Units by mouth daily. Refills:  0  
     
   
   
   
  
 * Notice: This list has 4 medication(s) that are the same as other medications prescribed for you. Read the directions carefully, and ask your doctor or other care provider to review them with you. STOP taking these medications   
 loratadine 10 mg tablet Commonly known as:  CLARITIN  
   
  
 medroxyPROGESTERone 5 mg tablet Commonly known as:  PROVERA Where to Get Your Medications Information on where to get these meds will be given to you by the nurse or doctor. ! Ask your nurse or doctor about these medications  
  docusate sodium 100 mg capsule HYDROcodone-acetaminophen  mg tablet  
 lactulose 20 gram/30 mL Soln solution  
 nitrofurantoin (macrocrystal-monohydrate) 100 mg capsule  
 ondansetron 8 mg disintegrating tablet Discharge Instructions Discharge Orders None [facility] Announcement We are excited to announce that we are making your provider's discharge notes available to you in [facility]. You will see these notes when they are completed and signed by the physician that discharged you from your recent hospital stay.   If you have any questions or concerns about any information you see in Askem, please call the Health Information Department where you were seen or reach out to your Primary Care Provider for more information about your plan of care. Introducing hospitals & [facility] SERVICES! Chato Hudson introduces Askem patient portal. Now you can access parts of your medical record, email your doctor's office, and request medication refills online. 1. In your internet browser, go to [email or web address] 2. Click on the First Time User? Click Here link in the Sign In box. You will see the New Member Sign Up page. 3. Enter your Askem Access Code exactly as it appears below. You will not need to use this code after youve completed the sign-up process. If you do not sign up before the expiration date, you must request a new code. · Askem Access Code: [email or web address] Expires: 9/7/2017  2:46 PM 
 
4. Enter the last four digits of your Social Security Number (xxxx) and Date of Birth (mm/dd/yyyy) as indicated and click Submit. You will be taken to the next sign-up page. 5. Create a Askem ID. This will be your Askem login ID and cannot be changed, so think of one that is secure and easy to remember. 6. Create a Askem password. You can change your password at any time. 7. Enter your Password Reset Question and Answer. This can be used at a later time if you forget your password. 8. Enter your e-mail address. You will receive e-mail notification when new information is available in [address] 9. Click Sign Up. You can now view and download portions of your medical record. 10. Click the Download Summary menu link to download a portable copy of your medical information. If you have questions, please visit the Frequently Asked Questions section of the Askem website. Remember, Askem is NOT to be used for urgent needs. For medical emergencies, dial 911. Now available from your iPhone and Android! General Information Please provide this summary of care documentation to your next provider. Patient Signature:  ____________________________________________________________ Date:  ____________________________________________________________  
  
Christia Sicilian Provider Signature:  ____________________________________________________________ Date:  ____________________________________________________________

## 2017-06-08 NOTE — PERIOP NOTES
0900  Report given to S. Camille Dubin from Brody Johnson RN. Gynecare Interceed Absorbable Adhesion Barrier 3 in X 4 in present on sterile field for use by surgeon. Lot [id number], exp 12/31/20.    0915  1000 mL 0.9% NaCl to top sterile field and 1000 mL 0.9% NaCl to bottom sterile field. 0925  1000 mL 0.9% NaCl connected to sterile suction . 1024  Surgical procedure started; patient requested in holding to update family only at the end of the procedure per Brody Johnson RN.    1208  Everett AH (Absorbable Hemostatic Particles) 3 grams to sterile field for use by surgeon. Lot [id number], exp 01/28/2022. Used at this time. 1228  Interceed used. 1310  1000 mL sterile water connected to sterile tubing for cystoscopy. 1330  Confirmed procedure and diagnosis with surgeon; verified only one specimen and specimen name with surgeon.

## 2017-06-08 NOTE — IP AVS SNAPSHOT
Current Discharge Medication List  
  
START taking these medications Dose & Instructions Dispensing Information Comments Morning Noon Evening Bedtime  
 docusate sodium 100 mg capsule Commonly known as:  Racielvalentina Hernandez Your last dose was: Your next dose is:    
   
   
 Dose:  100 mg Take 1 Cap by mouth two (2) times a day. Quantity:  90 Cap Refills:  0 HYDROcodone-acetaminophen  mg tablet Commonly known as:  Krcolten Debra Your last dose was: Your next dose is:    
   
   
 Dose:  1-2 Tab Take 1-2 Tabs by mouth every four (4) hours as needed. Max Daily Amount: 12 Tabs. Quantity:  60 Tab Refills:  0  
     
   
   
   
  
 lactulose 20 gram/30 mL Soln solution Commonly known as:  Alondra Gonzales Your last dose was: Your next dose is:    
   
   
 Dose:  20 g Take 30 mL by mouth three (3) times daily as needed. Quantity:  1 Bottle Refills:  1  
     
   
   
   
  
 nitrofurantoin (macrocrystal-monohydrate) 100 mg capsule Commonly known as:  MACROBID Your last dose was: Your next dose is:    
   
   
 Dose:  100 mg Take 1 Cap by mouth daily. Quantity:  10 Cap Refills:  0  
     
   
   
   
  
 ondansetron 8 mg disintegrating tablet Commonly known as:  ZOFRAN ODT Your last dose was: Your next dose is:    
   
   
 Dose:  8 mg Take 1 Tab by mouth every eight (8) hours as needed for Nausea. Quantity:  12 Tab Refills:  0 CONTINUE these medications which have NOT CHANGED Dose & Instructions Dispensing Information Comments Morning Noon Evening Bedtime * ALPRAZolam 0.5 mg tablet Commonly known as:  Gisele Sparrow Your last dose was: Your next dose is:    
   
   
 Dose:  0.5 mg Take 0.5 mg by mouth nightly. Refills:  0  
     
   
   
   
  
 * ALPRAZolam 0.25 mg tablet Commonly known as:  Gisele Sparrow Your last dose was: Your next dose is:    
   
   
 Dose:  0.25 mg Take 0.25 mg by mouth every morning. Refills:  0  
     
   
   
   
  
 amitriptyline 50 mg tablet Commonly known as:  ELAVIL Your last dose was: Your next dose is:    
   
   
 Dose:  50 mg Take 50 mg by mouth nightly. Refills:  0  
     
   
   
   
  
 carbidopa-levodopa  mg rapid dissolve tablet Commonly known as:  PARCOPA Your last dose was: Your next dose is:    
   
   
 Dose:  1 Tab Take 1 Tab by mouth nightly. Refills:  0  
     
   
   
   
  
 DULOXETINE PO Your last dose was: Your next dose is:    
   
   
 Dose:  90 mg Take 90 mg by mouth nightly. Refills:  0  
     
   
   
   
  
 GABAPENTIN PO Your last dose was: Your next dose is:    
   
   
 Dose:  1200 mg Take 1,200 mg by mouth two (2) times a day. Refills:  0  
     
   
   
   
  
 indomethacin 50 mg capsule Commonly known as:  INDOCIN Your last dose was: Your next dose is:    
   
   
 Dose:  50 mg Take 50 mg by mouth two (2) times a day. Refills:  0  
     
   
   
   
  
 LANTUS 100 unit/mL injection Generic drug:  insulin glargine Your last dose was: Your next dose is:    
   
   
 Dose:  18 Units 18 Units by SubCUTAneous route every morning. Refills:  0  
     
   
   
   
  
 metFORMIN 500 mg tablet Commonly known as:  GLUCOPHAGE Your last dose was: Your next dose is:    
   
   
 Dose:  500 mg Take 500 mg by mouth two (2) times daily (with meals). Refills:  0  
     
   
   
   
  
 methocarbamol 750 mg tablet Commonly known as:  ROBAXIN Your last dose was: Your next dose is:    
   
   
 Dose:  750 mg Take 750 mg by mouth three (3) times daily. Refills:  0  
     
   
   
   
  
 pantoprazole 40 mg tablet Commonly known as:  PROTONIX Your last dose was:     
   
Your next dose is:    
   
   
 Dose:  40 mg  
 Take 40 mg by mouth daily. Refills:  0  
     
   
   
   
  
 prazosin 5 mg capsule Commonly known as:  MINIPRESS Your last dose was: Your next dose is:    
   
   
 Dose:  5 mg Take 5 mg by mouth nightly. Refills:  0  
     
   
   
   
  
 RANITIDINE HCL PO Your last dose was: Your next dose is:    
   
   
 Dose:  150 mg Take 150 mg by mouth two (2) times a day. Refills:  0 SEROquel 200 mg tablet Generic drug:  QUEtiapine Your last dose was: Your next dose is:    
   
   
 Dose:  200 mg Take 200 mg by mouth nightly. Refills:  0  
     
   
   
   
  
 * VISTARIL 25 mg capsule Generic drug:  hydrOXYzine pamoate Your last dose was: Your next dose is:    
   
   
 Dose:  25 mg Take 25 mg by mouth every morning. Refills:  0  
     
   
   
   
  
 * VISTARIL PO Your last dose was: Your next dose is:    
   
   
 Dose:  75 mg Take 75 mg by mouth nightly. Refills:  0  
     
   
   
   
  
 VITAMIN D3 1,000 unit Cap Generic drug:  cholecalciferol Your last dose was: Your next dose is:    
   
   
 Dose:  1000 Units Take 1,000 Units by mouth daily. Refills:  0  
     
   
   
   
  
 * Notice: This list has 4 medication(s) that are the same as other medications prescribed for you. Read the directions carefully, and ask your doctor or other care provider to review them with you. STOP taking these medications   
 loratadine 10 mg tablet Commonly known as:  CLARITIN  
   
  
 medroxyPROGESTERone 5 mg tablet Commonly known as:  PROVERA Where to Get Your Medications Information on where to get these meds will be given to you by the nurse or doctor. ! Ask your nurse or doctor about these medications  
  docusate sodium 100 mg capsule HYDROcodone-acetaminophen  mg tablet  
 lactulose 20 gram/30 mL Soln solution nitrofurantoin (macrocrystal-monohydrate) 100 mg capsule  
 ondansetron 8 mg disintegrating tablet

## 2017-06-08 NOTE — PERIOP NOTES
Patient: Bhavin Miller MRN: [id number]  SSN: [id number]   YOB: 1970  Age: 42 y.o. Sex: female     Patient is status post Procedure(s):  ROBOTIC SUPRACERVICAL HYSTERECTOMY, BILATERAL SALPINGECTOMY, SACROCOPOPEXY, ENTEROCELE AND RECTOCELE REPAIR, RETROPUBIC SLING, CYSTOSCOPY WITH CALIBRATION, LYSIS OF ADHESIONS, URETERAL LYSIS OF ADHESIONS. Surgeon(s) and Role:     * Alta Reynoso MD - Primary    Local/Dose/Irrigation:  11 mL 0.5% marcaine with EPI 1:200,000 to abdomen; 13 mL 0.5% Lidocaine with EPI 1:200,000 vaginally; metrogel 7 grams on vaginal packing; 0.9% NaCl irrigation to abdomen; sterile water irrigation for cystoscopy; Everett AH internally to through trocar sites;  Interceed placed internally through trocar sites                  Peripheral IV 06/08/17 Left Hand (Active)   Dressing Status Clean, dry, & intact 6/8/2017  8:29 AM   Dressing Type Transparent 6/8/2017  8:29 AM   Hub Color/Line Status Infusing 6/8/2017  8:29 AM       Peripheral IV 06/08/17 Right Arm (Active)   Site Assessment Clean, dry, & intact 6/8/2017  8:45 AM   Dressing Status Clean, dry, & intact 6/8/2017  8:45 AM   Dressing Type Transparent 6/8/2017  8:45 AM   Hub Color/Line Status Infusing 6/8/2017  8:45 AM          Orogastric Tube 06/08/17 (Active)      Airway - Endotracheal Tube 06/08/17 Oral (Active)                   Dressing/Packing:  Wound Abdomen-DRESSING TYPE: Topical skin adhesive/glue (06/08/17 1338)  Wound Vagina-DRESSING TYPE: Packing;Ana-pad (06/08/17 1338)  Wound Pelvis-DRESSING TYPE: Topical skin adhesive/glue (06/08/17 1338)      Other:  16 fr armijo

## 2017-06-08 NOTE — BRIEF OP NOTE
BRIEF OPERATIVE NOTE    Date of Procedure: 6/8/2017   Preoperative Diagnosis: UTEROVAGINAL PROLAPSE, STRESS URINARY INCONTINENCE, PELVIC ADHESIONS  Postoperative Diagnosis: UTEROVAGINAL PROLAPSE, STRESS URINARY INCONTINENCE, PELVIC ADHESIONS    Procedure(s):  ROBOTIC SUPRACERVICAL HYSTERECTOMY, BILATERAL SALPINGECTOMY, SACROCOPOPEXY, ENTEROCELE AND RECTOCELE REPAIR, RETROPUBIC SLING, CYSTOSCOPY WITH CALIBRATION, LYSIS OF ADHESIONS, URETERAL LYSIS OF ADHESIONS  Surgeon(s) and Role:     * Lou Gao MD - Primary         Assistant Staff:       Surgical Staff:  Circ-1: Cristin Robison RN  Circ-Relief: Shady Mtz  Scrub Tech-1: Beatriz Lara  Scrub RN-Relief: Palmira Gonsales RN; Jose Mendez RN  Surg Asst-1: Hodan Potter  Float Staff: Palmira Gonsales RN  Event Time In   Incision Start 1024   Incision Close      Anesthesia: General   Estimated Blood Loss: 20 cc  Specimens:   ID Type Source Tests Collected by Time Destination   1 : Uterine Fundus and Bilateral Fallopian Tubes Fresh Uterus with Bilateral Fallopian Tubes  Lou Gao MD 6/8/2017 1254 Pathology      Findings: as above   Complications: none  Implants:   Implant Name Type Inv.  Item Serial No.  Lot No. LRB No. Used Action   SLING MESH PELV Y SHP JAMES -- [place] - SNA  SLING MESH PELV Y SHP JAMES -- [place] NA [facility] UROLOGY-WOMENS [facility] [id number] N/A 1 Implanted   SLING GYN FEM PELV F/INCONT LF -- I-STOP - SNA   SLING GYN FEM PELV F/INCONT LF -- I-STOP NA [facility]17-15 N/A 1 Implanted

## 2017-06-08 NOTE — IP AVS SNAPSHOT
Summary of Care Report The Summary of Care report has been created to help improve care coordination. Users with access to [employer] or [address] (Web-based application) may access additional patient information including the Discharge Summary. If you are not currently a [address] user and need more information, please call the number listed below in the [place] [address] section and ask to be connected with Medical Records. Facility Information Name Address Phone [address] [facility] [address] [address] [phone] Patient Information Patient Name Sex  Liliana Velasquez ([id number]) Female 1970 Discharge Information Admitting Provider Service Area Unit Rodrigo Kirkpatrick MD / [place] [address] [place] [address] / [phone] Discharge Provider Discharge Date/Time Discharge Disposition Destination (none) 2017 Afternoon (Pending) AHR (none) Patient Language Language ENGLISH [13] Hospital Problems as of 2017  Reviewed: 2017  8:49 AM by NADIA Diamond Non-Hospital Problems as of 2017  Reviewed: 2017  8:49 AM by Alo Mcdowell CRNA None You are allergic to the following Allergen Reactions Wellbutrin (Bupropion Hcl) Seizures Sulfa (Sulfonamide Antibiotics) Hives Current Discharge Medication List  
  
START taking these medications Dose & Instructions Dispensing Information Comments  
 docusate sodium 100 mg capsule Commonly known as:  Tamar Poole Dose:  100 mg Take 1 Cap by mouth two (2) times a day. Quantity:  90 Cap Refills:  0 HYDROcodone-acetaminophen  mg tablet Commonly known as:  Husseinmihelga Vazquezndbrianna Dose:  1-2 Tab Take 1-2 Tabs by mouth every four (4) hours as needed. Max Daily Amount: 12 Tabs. Quantity:  60 Tab Refills:  0  
   
 lactulose 20 gram/30 mL Soln solution Commonly known as:  Irma Fariha Dose:  20 g Take 30 mL by mouth three (3) times daily as needed. Quantity:  1 Bottle Refills:  1  
   
 nitrofurantoin (macrocrystal-monohydrate) 100 mg capsule Commonly known as:  MACROBID Dose:  100 mg Take 1 Cap by mouth daily. Quantity:  10 Cap Refills:  0  
   
 ondansetron 8 mg disintegrating tablet Commonly known as:  ZOFRAN ODT Dose:  8 mg Take 1 Tab by mouth every eight (8) hours as needed for Nausea. Quantity:  12 Tab Refills:  0 CONTINUE these medications which have NOT CHANGED Dose & Instructions Dispensing Information Comments * ALPRAZolam 0.5 mg tablet Commonly known as:  Steph Knee Dose:  0.5 mg Take 0.5 mg by mouth nightly. Refills:  0  
   
 * ALPRAZolam 0.25 mg tablet Commonly known as:  Steph Knee Dose:  0.25 mg Take 0.25 mg by mouth every morning. Refills:  0  
   
 amitriptyline 50 mg tablet Commonly known as:  ELAVIL Dose:  50 mg Take 50 mg by mouth nightly. Refills:  0  
   
 carbidopa-levodopa  mg rapid dissolve tablet Commonly known as:  PARCOPA Dose:  1 Tab Take 1 Tab by mouth nightly. Refills:  0  
   
 DULOXETINE PO Dose:  90 mg Take 90 mg by mouth nightly. Refills:  0  
   
 GABAPENTIN PO Dose:  1200 mg Take 1,200 mg by mouth two (2) times a day. Refills:  0  
   
 indomethacin 50 mg capsule Commonly known as:  INDOCIN Dose:  50 mg Take 50 mg by mouth two (2) times a day. Refills:  0  
   
 LANTUS 100 unit/mL injection Generic drug:  insulin glargine Dose:  18 Units 18 Units by SubCUTAneous route every morning. Refills:  0  
   
 metFORMIN 500 mg tablet Commonly known as:  GLUCOPHAGE Dose:  500 mg Take 500 mg by mouth two (2) times daily (with meals). Refills:  0  
   
 methocarbamol 750 mg tablet Commonly known as:  ROBAXIN  Dose:  750 mg  
 Take 750 mg by mouth three (3) times daily. Refills:  0  
   
 pantoprazole 40 mg tablet Commonly known as:  PROTONIX Dose:  40 mg Take 40 mg by mouth daily. Refills:  0  
   
 prazosin 5 mg capsule Commonly known as:  MINIPRESS Dose:  5 mg Take 5 mg by mouth nightly. Refills:  0  
   
 RANITIDINE HCL PO Dose:  150 mg Take 150 mg by mouth two (2) times a day. Refills:  0 SEROquel 200 mg tablet Generic drug:  QUEtiapine Dose:  200 mg Take 200 mg by mouth nightly. Refills:  0  
   
 * VISTARIL 25 mg capsule Generic drug:  hydrOXYzine pamoate Dose:  25 mg Take 25 mg by mouth every morning. Refills:  0  
   
 * VISTARIL PO Dose:  75 mg Take 75 mg by mouth nightly. Refills:  0  
   
 VITAMIN D3 1,000 unit Cap Generic drug:  cholecalciferol Dose:  1000 Units Take 1,000 Units by mouth daily. Refills:  0  
   
 * Notice: This list has 4 medication(s) that are the same as other medications prescribed for you. Read the directions carefully, and ask your doctor or other care provider to review them with you. STOP taking these medications Comments  
 loratadine 10 mg tablet Commonly known as:  CLARITIN  
   
   
 medroxyPROGESTERone 5 mg tablet Commonly known as:  PROVERA Surgery Information ID Date/Time Status Primary Surgeon All Procedures Location [id number] 6/8/2017 0900 [address] [place] MD ROBOTIC SUPRACERVICAL HYSTERECTOMY, BILATERAL SALPINGECTOMY, SACROCOPOPEXY, ENTEROCELE AND RECTOCELE REPAIR, RETROPUBIC SLING, CYSTOSCOPY WITH CALIBRATION, LYSIS OF ADHESIONS, URETERAL LYSIS OF ADHESIONS [facility] MAIN OR Follow-up Information Follow up With Details Comments Contact Info Sofie Rebolledo MD   Patient can only remember the practice name and not the physician Discharge Instructions Chart Review Routing History No Routing History on File

## 2017-06-08 NOTE — PROGRESS NOTES
pts blood sugar 202 at 1717. Dr Judd Kendrick office notified. The on call dr states we should recheck the pts blood sugar after she eats. Orders are put in for sliding scale insulin coverage.

## 2017-06-08 NOTE — ANESTHESIA PREPROCEDURE EVALUATION
Anesthetic History   No history of anesthetic complications            Review of Systems / Medical History  Patient summary reviewed, nursing notes reviewed and pertinent labs reviewed    Pulmonary  Within defined limits                 Neuro/Psych         Psychiatric history     Cardiovascular                  Exercise tolerance: >4 METS  Comments: S/p PDA and RCA/RV   GI/Hepatic/Renal     GERD      Liver disease (YEAGER)     Endo/Other    Diabetes: type 2    Arthritis     Other Findings              Physical Exam    Airway  Mallampati: II  TM Distance: > 6 cm  Neck ROM: normal range of motion, short neck   Mouth opening: Normal     Cardiovascular    Rhythm: regular  Rate: normal         Dental    Dentition: Poor dentition and Upper partial plate     Pulmonary  Breath sounds clear to auscultation               Abdominal  GI exam deferred       Other Findings            Anesthetic Plan    ASA: 2  Anesthesia type: general          Induction: Intravenous  Anesthetic plan and risks discussed with: Patient

## 2017-06-09 VITALS
BODY MASS INDEX: 36.89 KG/M2 | RESPIRATION RATE: 17 BRPM | TEMPERATURE: 97.7 F | HEART RATE: 108 BPM | WEIGHT: 235.01 LBS | OXYGEN SATURATION: 96 % | DIASTOLIC BLOOD PRESSURE: 68 MMHG | SYSTOLIC BLOOD PRESSURE: 116 MMHG | HEIGHT: 67 IN

## 2017-06-09 LAB
GLUCOSE BLD STRIP.AUTO-MCNC: 145 MG/DL (ref 65–100)
GLUCOSE BLD STRIP.AUTO-MCNC: 168 MG/DL (ref 65–100)
HCT VFR BLD AUTO: 34.1 % (ref 35–47)
HGB BLD-MCNC: 11.3 G/DL (ref 11.5–16)
SERVICE CMNT-IMP: ABNORMAL
SERVICE CMNT-IMP: ABNORMAL

## 2017-06-09 PROCEDURE — 82962 GLUCOSE BLOOD TEST: CPT

## 2017-06-09 PROCEDURE — 36415 COLL VENOUS BLD VENIPUNCTURE: CPT | Performed by: NURSE PRACTITIONER

## 2017-06-09 PROCEDURE — 74011250637 HC RX REV CODE- 250/637: Performed by: NURSE PRACTITIONER

## 2017-06-09 PROCEDURE — 74011636637 HC RX REV CODE- 636/637: Performed by: NURSE PRACTITIONER

## 2017-06-09 PROCEDURE — 74011250636 HC RX REV CODE- 250/636: Performed by: NURSE PRACTITIONER

## 2017-06-09 PROCEDURE — 77030018846 HC SOL IRR STRL H20 ICUM -A

## 2017-06-09 PROCEDURE — G0378 HOSPITAL OBSERVATION PER HR: HCPCS

## 2017-06-09 PROCEDURE — 85018 HEMOGLOBIN: CPT | Performed by: NURSE PRACTITIONER

## 2017-06-09 PROCEDURE — 99218 HC RM OBSERVATION: CPT

## 2017-06-09 PROCEDURE — 74011250636 HC RX REV CODE- 250/636: Performed by: OBSTETRICS & GYNECOLOGY

## 2017-06-09 RX ORDER — OXYCODONE AND ACETAMINOPHEN 5; 325 MG/1; MG/1
1-2 TABLET ORAL
Status: DISCONTINUED | OUTPATIENT
Start: 2017-06-09 | End: 2017-06-09 | Stop reason: HOSPADM

## 2017-06-09 RX ORDER — HYDROCODONE BITARTRATE AND ACETAMINOPHEN 10; 325 MG/1; MG/1
1-2 TABLET ORAL
Status: DISCONTINUED | OUTPATIENT
Start: 2017-06-09 | End: 2017-06-09 | Stop reason: HOSPADM

## 2017-06-09 RX ORDER — NITROFURANTOIN 25; 75 MG/1; MG/1
100 CAPSULE ORAL DAILY
Qty: 10 CAP | Refills: 0 | Status: SHIPPED | OUTPATIENT
Start: 2017-06-09 | End: 2018-05-03

## 2017-06-09 RX ORDER — DOCUSATE SODIUM 100 MG/1
100 CAPSULE, LIQUID FILLED ORAL 2 TIMES DAILY
Qty: 90 CAP | Refills: 0 | Status: SHIPPED | OUTPATIENT
Start: 2017-06-09 | End: 2018-05-03

## 2017-06-09 RX ORDER — LACTULOSE 10 G/15ML
20 SOLUTION ORAL
Qty: 1 BOTTLE | Refills: 1 | Status: SHIPPED | OUTPATIENT
Start: 2017-06-09 | End: 2018-05-03

## 2017-06-09 RX ORDER — ONDANSETRON 8 MG/1
8 TABLET, ORALLY DISINTEGRATING ORAL
Qty: 12 TAB | Refills: 0 | Status: SHIPPED | OUTPATIENT
Start: 2017-06-09 | End: 2018-05-03

## 2017-06-09 RX ORDER — HYDROCODONE BITARTRATE AND ACETAMINOPHEN 10; 325 MG/1; MG/1
1-2 TABLET ORAL
Qty: 60 TAB | Refills: 0 | Status: SHIPPED | OUTPATIENT
Start: 2017-06-09 | End: 2018-05-03

## 2017-06-09 RX ORDER — KETOROLAC TROMETHAMINE 30 MG/ML
30 INJECTION, SOLUTION INTRAMUSCULAR; INTRAVENOUS
Status: COMPLETED | OUTPATIENT
Start: 2017-06-09 | End: 2017-06-09

## 2017-06-09 RX ADMIN — GABAPENTIN 1200 MG: 600 TABLET, FILM COATED ORAL at 08:53

## 2017-06-09 RX ADMIN — SODIUM CHLORIDE, SODIUM LACTATE, POTASSIUM CHLORIDE, AND CALCIUM CHLORIDE 125 ML/HR: 600; 310; 30; 20 INJECTION, SOLUTION INTRAVENOUS at 08:00

## 2017-06-09 RX ADMIN — INSULIN LISPRO 2 UNITS: 100 INJECTION, SOLUTION INTRAVENOUS; SUBCUTANEOUS at 12:16

## 2017-06-09 RX ADMIN — Medication 10 ML: at 08:57

## 2017-06-09 RX ADMIN — HYDROXYZINE HYDROCHLORIDE 25 MG: 25 TABLET, FILM COATED ORAL at 08:53

## 2017-06-09 RX ADMIN — FAMOTIDINE 20 MG: 20 TABLET ORAL at 08:53

## 2017-06-09 RX ADMIN — KETOROLAC TROMETHAMINE 30 MG: 30 INJECTION, SOLUTION INTRAMUSCULAR at 01:50

## 2017-06-09 RX ADMIN — DOCUSATE SODIUM 100 MG: 100 CAPSULE, LIQUID FILLED ORAL at 08:53

## 2017-06-09 RX ADMIN — DIPHENHYDRAMINE HYDROCHLORIDE 12.5 MG: 50 INJECTION, SOLUTION INTRAMUSCULAR; INTRAVENOUS at 03:39

## 2017-06-09 RX ADMIN — DIPHENHYDRAMINE HYDROCHLORIDE 12.5 MG: 50 INJECTION, SOLUTION INTRAMUSCULAR; INTRAVENOUS at 00:48

## 2017-06-09 RX ADMIN — Medication 10 ML: at 06:00

## 2017-06-09 RX ADMIN — PRAZOSIN HYDROCHLORIDE 5 MG: 5 CAPSULE ORAL at 01:53

## 2017-06-09 RX ADMIN — HYDROCODONE BITARTRATE AND ACETAMINOPHEN 2 TABLET: 10; 325 TABLET ORAL at 14:55

## 2017-06-09 RX ADMIN — DIPHENHYDRAMINE HYDROCHLORIDE 12.5 MG: 50 INJECTION, SOLUTION INTRAMUSCULAR; INTRAVENOUS at 08:53

## 2017-06-09 RX ADMIN — HYDROCODONE BITARTRATE AND ACETAMINOPHEN 2 TABLET: 10; 325 TABLET ORAL at 10:31

## 2017-06-09 RX ADMIN — OXYCODONE HYDROCHLORIDE AND ACETAMINOPHEN 1 TABLET: 5; 325 TABLET ORAL at 03:39

## 2017-06-09 NOTE — PROGRESS NOTES
Bedside and Verbal shift change report given to [address] (oncoming nurse) by Phoebe Gray (offgoing nurse). Report included the following information SBAR, Kardex, Intake/Output, MAR, Accordion and Recent Results.

## 2017-06-09 NOTE — PROGRESS NOTES
2nd page to NP. Patient complaining of uncontrollable migraine headache. NP notified of headache when I spoke to her to about ordering the patients PTA meds.

## 2017-06-09 NOTE — DISCHARGE SUMMARY
Gynecology Discharge Summary     Patient ID:  Octavia Shea  [id number]  45 y.o.  1970    Admit date: 6/8/2017    Discharge date and time: 6/9/2017     Admission Diagnoses: There is no problem list on file for this patient. Discharge Diagnoses: No discharge information exists for this patient. Active Problems:    * No active hospital problems. *    Problem List as of 6/9/2017  Date Reviewed: 6/8/2017    None          Procedures for this admission: Procedure(s):  ROBOTIC SUPRACERVICAL HYSTERECTOMY, BILATERAL SALPINGECTOMY, SACROCOPOPEXY, ENTEROCELE AND RECTOCELE REPAIR, RETROPUBIC SLING, CYSTOSCOPY WITH CALIBRATION, LYSIS OF ADHESIONS, URETERAL LYSIS OF ADHESIONS  Labs:   Recent Labs      06/09/17   0418   [facility] care    Disposition: Home or self care    Discharged Condition: stable    Patient Instructions:   Current Discharge Medication List      START taking these medications    Details   HYDROcodone-acetaminophen (NORCO)  mg tablet Take 1-2 Tabs by mouth every four (4) hours as needed. Max Daily Amount: 12 Tabs. Qty: 60 Tab, Refills: 0      docusate sodium (COLACE) 100 mg capsule Take 1 Cap by mouth two (2) times a day. Qty: 90 Cap, Refills: 0      lactulose (CHRONULAC) 20 gram/30 mL soln solution Take 30 mL by mouth three (3) times daily as needed. Qty: 1 Bottle, Refills: 1      nitrofurantoin, macrocrystal-monohydrate, (MACROBID) 100 mg capsule Take 1 Cap by mouth daily. Qty: 10 Cap, Refills: 0      ondansetron (ZOFRAN ODT) 8 mg disintegrating tablet Take 1 Tab by mouth every eight (8) hours as needed for Nausea. Qty: 12 Tab, Refills: 0         CONTINUE these medications which have NOT CHANGED    Details   metFORMIN (GLUCOPHAGE) 500 mg tablet Take 500 mg by mouth two (2) times daily (with meals). insulin glargine (LANTUS) 100 unit/mL injection 18 Units by SubCUTAneous route every morning.       cholecalciferol (VITAMIN D3) 1,000 unit cap Take 1,000 Units by mouth daily. indomethacin (INDOCIN) 50 mg capsule Take 50 mg by mouth two (2) times a day. GABAPENTIN PO Take 1,200 mg by mouth two (2) times a day. RANITIDINE HCL PO Take 150 mg by mouth two (2) times a day. carbidopa-levodopa (PARCOPA)  mg rapid dissolve tablet Take 1 Tab by mouth nightly. DULOXETINE HCL (DULOXETINE PO) Take 90 mg by mouth nightly. prazosin (MINIPRESS) 5 mg capsule Take 5 mg by mouth nightly. amitriptyline (ELAVIL) 50 mg tablet Take 50 mg by mouth nightly. pantoprazole (PROTONIX) 40 mg tablet Take 40 mg by mouth daily. !! ALPRAZolam (XANAX) 0.5 mg tablet Take 0.5 mg by mouth nightly. !! ALPRAZolam (XANAX) 0.25 mg tablet Take 0.25 mg by mouth every morning. QUEtiapine (SEROQUEL) 200 mg tablet Take 200 mg by mouth nightly. !! hydrOXYzine pamoate (VISTARIL) 25 mg capsule Take 25 mg by mouth every morning. methocarbamol (ROBAXIN) 750 mg tablet Take 750 mg by mouth three (3) times daily. !! HYDROXYZINE PAMOATE (VISTARIL PO) Take 75 mg by mouth nightly. !! - Potential duplicate medications found. Please discuss with provider. STOP taking these medications       loratadine (CLARITIN) 10 mg tablet Comments:   Reason for Stopping:         medroxyPROGESTERone (PROVERA) 5 mg tablet Comments:   Reason for Stopping:              Activity: Activity as tolerated, No lifting, Driving, or Strenuous exercise for 6 weeks, No heavy lifting, pushing, pulling. No heavy lifting for 6 weeks  Diet: Regular Diet  Wound Care: Keep wound clean and dry    Follow-up with Osmar Sebastian NP  in 2 weeks- see dc paperwork.     Signed:  Aj Miller NP  6/9/2017  1:34 PM

## 2017-06-09 NOTE — PROGRESS NOTES
Primary Nurse Fermin Mckeon and Elsy Macario RN performed a dual skin assessment on this patient Impairment noted- see wound doc flow sheet.  Abrasion right heel  Ariel score is 22

## 2017-06-09 NOTE — PROGRESS NOTES
Physical Therapy Screening:  Services are not indicated at this time. An InBasket screening referral was triggered for physical therapy based on results obtained during the nursing admission assessment. The patients chart was reviewed and the patient is not appropriate for a skilled therapy evaluation at this time. Please consult physical therapy if any therapy needs arise. Thank you.     Ko Vicente, PT

## 2017-06-09 NOTE — PROGRESS NOTES
At start of voiding trial after Greene was d/c, patient voided 150 cc after bladder was filled with 300 cc. Patient has until 7886 to complete a successful trial.     0700 Patient up to change undergarments, once back in bed I told her that she needed to put her SCDs back on, patient refused and said that they made her itch.

## 2017-06-09 NOTE — ANESTHESIA POSTPROCEDURE EVALUATION
Post-Anesthesia Evaluation and Assessment    Patient: Bhavin Miller MRN: [id number]  SSN: [id number]    YOB: 1970  Age: 42 y.o. Sex: female       Cardiovascular Function/Vital Signs  Visit Vitals    /68 (BP 1 Location: Right arm, BP Patient Position: At rest)    Pulse (!) 108    Temp 36.5 °C (97.7 °F)    Resp 17    Ht 5' 7\" (1.702 m)    Wt 106.6 kg (235 lb 0.2 oz)    SpO2 96%    BMI 36.81 kg/m2       Patient is status post general anesthesia for Procedure(s):  ROBOTIC SUPRACERVICAL HYSTERECTOMY, BILATERAL SALPINGECTOMY, SACROCOPOPEXY, ENTEROCELE AND RECTOCELE REPAIR, RETROPUBIC SLING, CYSTOSCOPY WITH CALIBRATION, LYSIS OF ADHESIONS, URETERAL LYSIS OF ADHESIONS. Nausea/Vomiting: None    Postoperative hydration reviewed and adequate. Pain:  Pain Scale 1: Numeric (0 - 10) (06/09/17 0826)  Pain Intensity 1: 10 (06/09/17 0826)   Managed    Neurological Status:   Neuro (WDL): Within Defined Limits (06/08/17 1515)  Neuro  LUE Motor Response: Purposeful (06/09/17 0145)  LLE Motor Response: Purposeful (06/09/17 0145)  RUE Motor Response: Purposeful (06/09/17 0145)  RLE Motor Response: Purposeful (06/09/17 0145)   At baseline    Mental Status and Level of Consciousness: Arousable    Pulmonary Status:   O2 Device: Room air (06/09/17 0826)   Adequate oxygenation and airway patent    Complications related to anesthesia: None    Post-anesthesia assessment completed.  No concerns    Signed By: Terry Teresa MD     June 9, 2017

## 2017-06-09 NOTE — PROGRESS NOTES
0410 MEWS of 4 noted, patient in no distress- no trouble breathing, no shortness of breath, and no chest pain. Checked orders. Parameters do not meet such to call doctor. 0448 Vitals retaken by PCT, MEWS still a 4 due to HR, patient has no complaints, is in no distress as noted by RN. Will notify doctor of any changes if necessary.      Patient Vitals for the past 4 hrs:   Temp Pulse Resp BP SpO2   06/09/17 0448 98 °F (36.7 °C) (!) 118 18 92/58 91 %   06/09/17 0410 98 °F (36.7 °C) (!) 111 16 (!) 85/44 93 %

## 2017-06-15 NOTE — OP NOTES
[address]   Anca Johnson, [address]   OP NOTE       Name:  Michelle Garcia   MR#:  [id number]   :  1970   Account #:  [de-identified]    Surgery Date:  2017   Date of Adm:  2017       PREOPERATIVE DIAGNOSES:    1. Uterine prolapse. 2. Vaginal vault prolapse. 3. Vaginal prolapse. 4. Intrinsic sphincter deficiency . POSTOPERATIVE DIAGNOSES:    1. Uterine prolapse. 2. Vaginal vault prolapse. 3. Vaginal prolapse. 4. Intrinsic sphincter deficiency   5. Pelvic adhesions. PROCEDURES PERFORMED:    1. Robotic-assisted cervical hysterectomy and bilateral salpingectomy. 2. Abdominal sacral colpopexy. 3.  eNTEROCELE repair    4. Right ureterolysis. 5. Lysis of pelvic adhesions. 6. I-STOP retropubic suburethral sling. 7.   Cystoscopy WITH calibration. SURGEON: Viviane Benjamin. MD Celina    ESTIMATED BLOOD LOSS: 20 mL. SPECIMEN: Uterine fundus, bilateral tubes to Pathology. COMPLICATIONS: None. ANESTHESIA: General.    DESCRIPTION OF PROCEDURE: The patient was taken to the OR. General anesthesia was induced. Prophylactic antibiotics were given. She was sterilely prepped and draped in the normal lithotomy position   in the usual fashion. Greene catheter was placed. The uterine   manipulator was placed in the endometrial cavity. Attention was turned towards the abdomen. A separate umbilical   incision was made. The Veress needle inserted in the peritoneal cavity. The abdomen was adequately insufflated. A 12 port was placed. Robotic camera was placed. incision site was inspected    vasculature. Four additional ports were placed under laparoscopic   vision. The patient was placed in  position. The robot was docked. Attention was turned toward the abdomen, and a supraumbilical   incision was made and the Veress inserted in the peritoneal cavity. The   abdomen was adequately insufflated. The 12 port was placed.  The robotic camera was placed  site was inspected and no injury was   noted to bowel or vasculature. Four additional ports were placed   under direct laparoscopic position. The robot was docked. instruments were brought under direct vision. The pelvis was   inspected. There were noted to be adhesions of the sigmoid colon onto   the pelvic sidewall, as well as on the anterior abdominal wall,  blunt   dissection and   cautery was used to mobilize these   adhesions. Approximately half an hour was required for adhesiolysis. At the conclusion of adhesiolysis, the bowel was carefully inspected. There was injury to the bowel and the pelvic sidewall was inspected   and no injury was noted. Excellent hemostasis was achieved and out   of the operative field. The  was then started,  combination of bipolar and monopolar   cautery. Bladder flap was created. The bladder was dissected down out   of the operative field. were identified peristalsing normally out of the   operative field. The ovaries were then inspected and noted to be   normal in appearance. The fallopian tubes were  from    attachments with monopolar cautery and the uterine ovarian   ligaments were divided with a combination of bipolar and monopolar   cautery. Broad ligament was skeletonized   uterine arteries. The   uterine arteries were  divided. The  blood supply to the uterus. Monopolar cautery was used to come across the cervix, internal os,    the uterine fundus and bilateral tubes from the cervical   stump. Specimen was  field and the cervical canal was completely    with monopolar cautery. Excellent hemostasis was achieved on the   cervical stump. Next, sacral colpopexy was then initiated. The cervix and vagina were   . The bladder was dissected off the anterior vagina with combination   of blunt dissection and monopolar cautery. The posterior rectum was   dissected off in the same manner  posterior peritoneum.   the right side of the pelvis. Taking care to avoid the right ureter, rectum and   sigmoid colon, this was carried out . The  and right ureter    encroaching the midline incision  . dissection with monopolar   cautery used to mobilize the right ureter  allowing access to the   anterior longitudinal  sacrum. measures and  brought into the peritoneal cavity. Anterior and   posterior  firmly and flatly attaching to the posterior vagina with a   running nonlocking silk PDS  suture. The vagina was then  portion   of graft. This was attached to the anterior longitudinal in the sacrum    sutures. Excellent vaginal vault and cervical anterior and posterior   vaginal  ,  irrigated, no bleeding was noted. The mesh was   completely reperitonealized with a running  PDS  suture completely   covering the mesh. The pelvis was then inspected and the uterine fundus and bilateral   tubes were manually  abdomen with monopolar nelly and   EndoCatch bag. Care was taken to ensure no fragments were in the   peritoneal cavity. Again, the abdomen was inspected. No bleeding was   noted. No fragments were noted. The ureters were peristalsing   normally and out of the operative field. The bowel was carefully   inspected and no injury was noted to the bowel. were removed   under direct vision after the incisions were covered Interceed. The 12   ports were closed with interrupted  Vicryl suture, the fascia under   direct vision. All trocars were removed. All gas was expelled. All skin   incisions were closed with 4-0 Monocryl in subcuticular fashion and   covered with Dermabond. The abdomen was sterilely covered and   attention was placed  position. The I-STOP retropubic sling was then performed. The anterior vagina   grasped with 2  clamps, infiltrated with 1% Marcaine with   epinephrine. Once  mucosa dissected off .  bilaterally. Two skin   incisions were made 1 cm superior and 2 cm lateral in the midline.     I-STOP needles were inserted through the skin  . Gevena Yessenia This was   performed bilaterally. Cystoscopy calibration was then performed. scope inserted in the   bladder. The bladder was filled to approximately 300 mL, visualized   and bladder . There was no  injury to the bladder and  efflux of   urine from both  . The urethra was also carefully inspected. There   was no  to the urethra. The urethra  scope  bladder was drained. Once the cystoscopy was completed, the sling was completed, the    needle and it was brought through the skin  portion  fashion. Next,   vagina  in nonlocking fashion. The  was then performed . The posterior vagina was grasped with   2  clamps  normalized  caliber  1% lidocaine and epinephrine. The  was removed  posterior vagina  mucosa dissected off the    pelvic  sulcus. In the pelvic fascia, along the , midline  Vicryl   sutures muscles  midline for her perineoplasty, posterior vagina   closed  suture in nonlocking fashion. Following the procedure, the vagina was inspected  performed. There    . All counts were correct x2. supine position, stable  post-  anesthesia care unit.                   MD MARIELY Arriaza / ADALID   D:  06/15/2017   14:34   T:  06/15/2017   16:24   Job #:  [id number]

## 2018-05-04 PROBLEM — N39.3 STRESS INCONTINENCE: Status: ACTIVE | Noted: 2018-05-04

## 2018-05-04 PROBLEM — N39.41 URGE INCONTINENCE: Status: ACTIVE | Noted: 2018-05-04

## 2021-10-21 PROBLEM — M54.12 CERVICAL RADICULOPATHY AT C7: Status: ACTIVE | Noted: 2021-10-21

## 2021-10-21 PROBLEM — M50.123 CERVICAL DISC DISORDER AT C6-C7 LEVEL WITH RADICULOPATHY: Status: ACTIVE | Noted: 2021-10-21

## 2021-10-22 PROBLEM — M50.123 CERVICAL DISC DISORDER AT C6-C7 LEVEL WITH RADICULOPATHY: Status: RESOLVED | Noted: 2021-10-21 | Resolved: 2021-10-22

## 2021-10-22 PROBLEM — M54.12 CERVICAL RADICULOPATHY AT C7: Status: RESOLVED | Noted: 2021-10-21 | Resolved: 2021-10-22

## 2022-01-14 ENCOUNTER — HOSPITAL ENCOUNTER (OUTPATIENT)
Dept: PHYSICAL THERAPY | Age: 52
Discharge: HOME | End: 2022-01-14
Payer: OTHER GOVERNMENT

## 2022-01-14 PROCEDURE — 97162 PT EVAL MOD COMPLEX 30 MIN: CPT

## 2022-01-14 PROCEDURE — 97530 THERAPEUTIC ACTIVITIES: CPT

## 2022-01-14 PROCEDURE — 97110 THERAPEUTIC EXERCISES: CPT

## 2022-01-14 NOTE — PROGRESS NOTES
PT DAILY TREATMENT NOTE     Patient Name: Erica Fair  ZMHT:[id number]  : 1970  [x]  Patient  Verified  Payor: [facility] CCN / Plan: [facility] CCN / Product Type: Federal Funded Programs /    In time:11:17  Out time:12:15  Total Treatment Time (min): 58  Visit #: 1 of 10    Medicare/BCBS Only   Total Timed Codes (min):   1:1 Treatment Time:         Treatment Area:  Adhesive capsulitis of left shoulder [M75.02]  Cervicalgia [M54.2]    SUBJECTIVE  Pain Level (0-10 scale): 5  Any medication changes, allergies to medications, adverse drug reactions, diagnosis change, or new procedure performed?: [x] No    [] Yes (see summary sheet for update)  Subjective functional status/changes:   [] No changes reported    CC: neck pain, left shoulder pain/weakness  History/Mechanism of Injury: 10/21/21- C7 decompression  21Fusion C4-C7  Current Symptoms/Complaints: left shoulder weakness/stiffness; difficulty lifting pizza dough at work; range of motion loss prior to surgery  Neck pain with prolonged lifting/activity, stiffness upon waking in morning  Pain-  Current: 5/10     Worst: 6-7/10   Best: 4/10  Aggravated By: sleeping on left side (increased neck pain)  Alleviated By: warm shower  Previous Treatment/Compliance: PT/OT after surgeries  PMHx/Surgical Hx: DM II, anxiety, PTSD, depression, osteoporosisurinary incontinece, headaches,  PDA surgery as child, liposuction surgery,   Work Hx: works in [employer], repeated lifting pizzas,dough, 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 Rationale: to improve the patients ability to adhere to HEP and therapy sessions for increased compliance when working toward therapy goals.             With   [] TE   [x] TA   [] neuro   [] other: Patient Education: [x] Review HEP    [] Progressed/Changed HEP based on:   [] positioning   [] body mechanics   [] transfers   [] heat/ice application    [] other:      Other Objective/Functional Measures:     Observation: forward head  Palpation: TTP at B cervical paraspinals    Shoulder AROM/PROM:                                           AROM (deg)              PROM (deg)   Right Left Right Left   Seated Flexion  154 143 --------------- ---------------     Functional ER: right T2, left C6  Functional IR: right midthoracic spine, left to left SIJ    C/S Screening:   Flexion 21 deg, ext 40, right rotation 49 deg, left rotation 47 deg     Strength:   Right (/5) Left (/5)   GHJ   Flexion 5 4-             Abduction 5 4             Extension 5 5             ER 5 4             IR 5 4+   Upper Trapezius      Middle/Lower Trap     Elbow Flexion 5 5   Elbow Extension 5 4    Strength  NT     90 deg Flexion Endurance right 80\", 6\" on left    Joint Feel: some stiffness but not expected firm end feel  Scapulohumeral Rhythm: increased left UT involvement    Reflexes/Sensation: reports no sensation changes since second surgery    Pain Level (0-10 scale) post treatment: 5    ASSESSMENT/Changes in Function: See POC    Patient will continue to benefit from skilled PT services to modify and progress therapeutic interventions, address functional mobility deficits, address ROM deficits, address strength deficits, analyze and address soft tissue restrictions, analyze and cue movement patterns, analyze and modify body mechanics/ergonomics, assess and modify postural abnormalities, address imbalance/dizziness and instruct in home and community integration to attain remaining goals.      [x]  See Plan of Care  []  See progress note/recertification  []  See Discharge Summary         Progress towards goals / Updated goals:  See POC    PLAN  [x]  Upgrade activities as tolerated     []  Continue plan of care  [x]  Update interventions per flow sheet       []  Discharge due to:_  []  Other:_      Sanya Ayoub 1/14/2022  8:43 AM    Future Appointments   Date Time Provider Buddy [place]   1/14/2022 11:00 AM Linda Joseph [facility]

## 2022-01-14 NOTE — PROGRESS NOTES
[facility] AT [address] [place]  Phone: [phone] Fax: [phone] / STATEMENT OF MEDICAL NECESSITY FOR PHYSICAL THERAPY SERVICES  Patient Name: Nevaeh Burleson :    Medical   Diagnosis: Cervicalgia [M54.2]  Left shoulder pain [M25.512] Treatment Diagnosis: Neck pain, left shoulder pain/weakness   Onset Date: 10/21/21 (DOS)     Referral Source: Beka Trujillo MD [facility]): 2022   Prior Hospitalization: See medical history Provider #: [id number]   Prior Level of Function: Functionally independent, works in [employer], ambidextrous   Comorbidities: DM II, anxiety, PTSD, depression, osteoporosisurinary incontinece, headaches,  PDA surgery as child, liposuction surgery,    Medications: Verified on Patient Summary List   The Plan of Care and following information is based on the information from the initial evaluation.     ========================================================================    Assessment / key information:  Pt is a pleasant 54 y.o. female who presents with c/o neck pain and left shoulder pain/weakness. The patient reports an extensive surgical history in the past 6 months impacting her condition. She was experiencing LE weakness due to cervical myelopathy resulting in cervical fusion (C4-C7) in 2021. After this surgery, she developed left C7 radiculopathy which resulted in decreased left shoulder weakness/decreased AROM; this was addressed by C7 foraminal opening surgery on 10/21/21. She has recovered well from this surgery but continues to note increased neck pain/stiffness and decreased left shoulder strength/endurance that impact her ability to complete PLOF work/household tasks. Rehab potential is good due to desire to attain PLOF.  Pt would benefit from skilled PT to address above deficits to improve Pt's function and ability to return to PLOF lifting/reaching with improved shoulder strength.      ========================================================================    Eval Complexity: History: HIGH Complexity :3+ comorbidities / personal factors will impact the outcome/ POC Exam:MEDIUM Complexity : 3 Standardized tests and measures addressing body structure, function, activity limitation and / or participation in recreation  Presentation: MEDIUM Complexity : Evolving with changing characteristics  Clinical Decision Making:MEDIUM Complexity : FOTO score of 26-74Overall Complexity:MEDIUM  Problem List: pain affecting function, decrease ROM, decrease strength, impaired gait/ balance, decrease ADL/ functional abilitiies, decrease activity tolerance, decrease flexibility/ joint mobility and decrease transfer abilities   Treatment Plan may include any combination of the following: Therapeutic exercise, Therapeutic activities, Neuromuscular re-education, Physical agent/modality, Gait/balance training, Manual therapy, Aquatic therapy, Patient education, Self Care training, Functional mobility training, Home safety training and Stair training  Patient / Family readiness to learn indicated by: asking questions    Persons(s) to be included in education: patient (P)  Barriers to Learning/Limitations: None  Measures taken:    Patient Goal (s): \"less pain, more strength\"   Patient self reported health status: good  Rehabilitation Potential: good    GOALS-  Short Term Goals: To be accomplished in 1 week  - Goal: Pt to be compliant with initial HEP to improve shoulder range of motion and pain. Status at last note/certification: Established and reviewed with Pt  Long Term Goals:  To be accomplished in 10 treatments  - Goal: Pt to demonstrate 5/5 shoulder flexion MMT of left shoulder to improve overhead lifting ability   Status at last note/certification: 4-/5 shoulder flexion MMT  - Goal: Pt to demonstrate at least 160 deg of B shoulder flexion AROM in seated position to facilitate ease of donning/doffing clothes, overhead lifting, and grooming tasks. 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 Status at last note/certification: 6 seconds  - Goal: Pt to report FOTO score of at least 61 pts to show improved function and quality of life. Status at last note/certification: FOTO 52 pts       Frequency / Duration:   -Patient to be seen  2-3  times per week for 10  treatments:       Patient / Caregiver education and instruction: activity modification and exercises    Therapist Signature: Heladio Valladares Date: 3/92/9023   Certification Period:  Time: 1:16 PM   ========================================================================  I certify that the above Physical Therapy Services are being furnished while the patient is under my care. I agree with the treatment plan and certify that this therapy is necessary. Physician Signature:        Date:       Time: ___                                            Christelle Renae MD.    Please sign and return to [facility] or you may fax the signed copy to [phone]. Thank you.

## 2022-01-18 ENCOUNTER — HOSPITAL ENCOUNTER (OUTPATIENT)
Dept: PHYSICAL THERAPY | Age: 52
Discharge: HOME | End: 2022-01-18
Payer: OTHER GOVERNMENT

## 2022-01-18 PROCEDURE — 97110 THERAPEUTIC EXERCISES: CPT

## 2022-01-18 PROCEDURE — 97112 NEUROMUSCULAR REEDUCATION: CPT

## 2022-01-18 NOTE — PROGRESS NOTES
PT DAILY TREATMENT NOTE     Patient Name: Hermelindo Duarte  KXBT:  : 1970  [x]  Patient  Verified  Payor: [facility] CCN / Plan: [facility] CCN / Product Type: Federal Funded Programs /    In [employer] time:11:36  Total Treatment Time (min): 42  Visit #: 2 of 10    Medicare/BCBS Only   Total Timed Codes (min):   1:1 Treatment Time:         Treatment Area: Cervicalgia [M54.2]  Left shoulder pain [M25.512]    SUBJECTIVE  Pain Level (0-10 scale): 0 (in neck, left shoulder)  Any medication changes, allergies to medications, adverse drug reactions, diagnosis change, or new procedure performed?: [x] No    [] Yes (see summary sheet for update)  Subjective functional status/changes:   [] No changes reported  \"I have been working on my exercises at home, no extra pain with them but my muscles are definitely tired after. \"    OBJECTIVE    30 min Therapeutic Exercise:  [x] See flow sheet :   Rationale: increase ROM and increase strength to improve the patients ability to perform ADLs with improved shoulder/elbow strength and mobility. 12 min Neuromuscular Re-education:  [x]  See flow sheet :   Rationale: increase ROM, increase strength, improve coordination, improve balance and increase proprioception  to improve the patients ability to perform overhead functional lifts with improved scapular stabilization and neutral cervical posture. With   [x] TE   [] TA   [] neuro   [] other: Patient Education: [x] Review HEP    [] Progressed/Changed HEP based on:   [] positioning   [] body mechanics   [] transfers   [] heat/ice application    [] other:      Other Objective/Functional Measures: -Most difficulty noted with chest press on left      Pain Level (0-10 scale) post treatment: 0    ASSESSMENT/Changes in Function: Advised pt to decrease frequency of HEP at home after initiating therapy sessions in clinic to allow time for muscle recovery between bouts of exercise.  Pt requires maximal verbal/tactile cuing for correct technique with interventions. Pt demonstrates neurological fatigue; once reaching fatigue levels, pt's form rapidly declines and unable to complete further interventions. Patient will continue to benefit from skilled PT services to modify and progress therapeutic interventions, address functional mobility deficits, address ROM deficits, address strength deficits, analyze and address soft tissue restrictions, analyze and cue movement patterns, analyze and modify body mechanics/ergonomics, assess and modify postural abnormalities, address imbalance/dizziness and instruct in home and community integration to attain remaining goals. []  See Plan of Care  []  See progress note/recertification  []  See Discharge Summary         Progress towards goals / Updated goals:  Short Term Goals: To be accomplished in 1 week  - Goal: Pt to be compliant with initial HEP to improve shoulder range of motion and pain. Status at last note/certification: Established and reviewed with Pt  Current: met, pt reports good compliance (1/18/22)  Long Term Goals: To be accomplished in 10 treatments  - Goal: Pt to demonstrate 5/5 shoulder flexion MMT of left shoulder to improve overhead lifting ability   Status at last note/certification: 4-/5 shoulder flexion MMT  - Goal: Pt to demonstrate at least 160 deg of B shoulder flexion AROM in seated position to facilitate ease of donning/doffing clothes, overhead lifting, and grooming tasks. 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 Status at last note/certification: 6 seconds  - Goal: Pt to report FOTO score of at least 61 pts to show improved function and quality of life.   Status at last note/certification: FOTO 52 pts PLAN  [x]  Upgrade activities as tolerated     [x]  Continue plan of care  []  Update interventions per flow sheet       []  Discharge due to:_  []  Other:_      Priscila Ramos 1/18/2022  10:33 AM    Future Appointments   Date Time Provider Buddy [place]   1/18/2022 10:45 AM Juanito Foster [facility]   1/20/2022 11:30 AM Juanito Foster [facility]   1/25/2022  2:45 PM Ariadna Villalobos., PT [facility]   1/28/2022  1:30 PM Liseth Núñez [facility]   2/1/2022  2:00 PM Ariadna Villalobos., PT [facility]   2/3/2022  2:00 PM Ariadna Villalobos., PT [facility]   2/8/2022  2:45 PM Juanito Foster [facility]   2/10/2022  2:45 PM Apple Briggs, PT [facility]   2/15/2022  2:45 PM Juanito Foster [facility]   2/17/2022  2:45 PM Apple Briggs, PT [facility]   2/22/2022  2:45 PM Juanito Foster [facility]   2/24/2022  2:45 PM Apple Briggs, PT [facility]

## 2022-01-20 ENCOUNTER — APPOINTMENT (OUTPATIENT)
Dept: PHYSICAL THERAPY | Age: 52
End: 2022-01-20
Payer: OTHER GOVERNMENT

## 2022-01-25 ENCOUNTER — HOSPITAL ENCOUNTER (OUTPATIENT)
Dept: PHYSICAL THERAPY | Age: 52
Discharge: HOME | End: 2022-01-25
Payer: OTHER GOVERNMENT

## 2022-01-25 PROCEDURE — 97110 THERAPEUTIC EXERCISES: CPT | Performed by: GENERAL ACUTE CARE HOSPITAL

## 2022-01-25 PROCEDURE — 97112 NEUROMUSCULAR REEDUCATION: CPT | Performed by: GENERAL ACUTE CARE HOSPITAL

## 2022-01-25 NOTE — PROGRESS NOTES
PT DAILY TREATMENT NOTE     Patient Name: Nazia Cabrera  RDKQ:  : 1970  [x]  Patient  Verified  Payor: [facility] CCN / Plan: [facility] CCN / Product Type: Federal Funded Programs /    In time: 2:45   Out time: 3:42   Total Treatment Time (min):57  Visit #: 3 of 10    Medicare/BCBS Only   Total Timed Codes (min):   1:1 Treatment Time:         Treatment Area: Cervicalgia [M54.2]  Left shoulder pain [M25.512]    SUBJECTIVE  Pain Level (0-10 scale): 0/10  Any medication changes, allergies to medications, adverse drug reactions, diagnosis change, or new procedure performed?: [x] No    [] Yes (see summary sheet for update)  Subjective functional status/changes:   [] No changes reported  Pt denies any pain in her L shoulder today. Notes some back pain after shoveling the snow this weekend. Ongoing difficulty with reaching and lifting past 90 degrees flexion.      OBJECTIVE    Modality rationale: decrease pain and increase tissue extensibility to improve the patients ability to perform ADL's, reaching, lifting    Min Type Additional Details    [] Estim:  []Unatt       []IFC  []Premod                        []Other:  []w/ice   []w/heat  Position:  Location:    [] Estim: []Att    []TENS instruct  []NMES                    []Other:  []w/US   []w/ice   []w/heat  Position:  Location:    []  Traction: [] Cervical       []Lumbar                       [] Prone          []Supine                       []Intermittent   []Continuous Lbs:  [] before manual  [] after manual    []  Ultrasound: []Continuous   [] Pulsed                           []1MHz   []3MHz Location:  W/cm2:    []  Iontophoresis with dexamethasone         Location: [] Take home patch   [] In clinic   10 []  Ice     [x]  heat  []  Ice massage  []  Laser   []  Anodyne Position: supine with wedge  Location: CS and L shoulder     []  Laser with stim  []  Other: Position:  Location:    []  Vasopneumatic Device  Pre-treatment girth:  Post-treatment girth:  Measured at (location):  Pressure:       [] lo [] med [] hi   Temperature: [] lo [] med [] hi   [x] Skin assessment post-treatment:  [x]intact []redness- no adverse reaction    []redness - adverse reaction:       32 min Therapeutic Exercise:  [x] See flow sheet :   Rationale: increase ROM and increase strength to improve the patients ability to perform ADLs with improved shoulder/elbow strength and mobility. 15 min Neuromuscular Re-education:  [x]  See flow sheet :   Rationale: increase ROM, increase strength, improve coordination, improve balance and increase proprioception  to improve the patients ability to perform overhead functional lifts with improved scapular stabilization and neutral cervical posture. With   [x] TE   [] TA   [] neuro   [] other: Patient Education: [x] Review HEP    [] Progressed/Changed HEP based on:   [] positioning   [] body mechanics   [] transfers   [] heat/ice application    [] other:      Other Objective/Functional Measures: Added UBE forward/retro , wall push ups, light OH press, bilateral ER with band  Attempted Body Blade, but moderate difficulty with ER/IR directions so held   Able to increase reps with previous exercises    Pain Level (0-10 scale) post treatment: 0     ASSESSMENT/Changes in Function: Good tolerance to addition of new exercises today, though continues to be easily fatigued. Note moderate weakness of rotator cuff during today's exercises in L shoulder. Continues to be challenged by pushing movements, but making progress. Cont to progress as tolerated. Continued the recommendations of previous therapist to rest and allow for adequate mm recovery after PT sessions and HEP.      Patient will continue to benefit from skilled PT services to modify and progress therapeutic interventions, address functional mobility deficits, address ROM deficits, address strength deficits, analyze and address soft tissue restrictions, analyze and cue movement patterns, analyze and modify body mechanics/ergonomics, assess and modify postural abnormalities, address imbalance/dizziness and instruct in home and community integration to attain remaining goals. [x]  See Plan of Care  []  See progress note/recertification  []  See Discharge Summary         Progress towards goals / Updated goals:  Short Term Goals: To be accomplished in 1 week  - Goal: Pt to be compliant with initial HEP to improve shoulder range of motion and pain. Status at last note/certification: Established and reviewed with Pt  Current: met, pt reports good compliance (1/18/22)  Long Term Goals: To be accomplished in 10 treatments  - Goal: Pt to demonstrate 5/5 shoulder flexion MMT of left shoulder to improve overhead lifting ability   Status at last note/certification: 4-/5 shoulder flexion MMT  - Goal: Pt to demonstrate at least 160 deg of B shoulder flexion AROM in seated position to facilitate ease of donning/doffing clothes, overhead lifting, and grooming tasks. 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 Status at last note/certification: 6 seconds  - Goal: Pt to report FOTO score of at least 61 pts to show improved function and quality of life.   Status at last note/certification: FOTO 52 pts     PLAN  [x]  Upgrade activities as tolerated     [x]  Continue plan of care  []  Update interventions per flow sheet       []  Discharge due to:_  []  Other:_      Fay Mark PT 1/25/2022  10:33 AM    Future Appointments   Date Time Provider Buddy [place]   1/25/2022  2:45 PM [address] [place] [address] [facility]   1/28/2022  1:30 PM Lionel Galeazzi [facility]   2/1/2022  2:00 PM Rajesh Knight., PT [facility]   2/3/2022  2:00 PM Janet Mcmillan., PT [facility] 2/8/2022  2:45 PM Katarzyna Rumps [facility]   2/10/2022  2:45 PM Andrews Christiansonolla, PT [facility]   2/15/2022  2:45 PM Katarzyna Rumps [facility]   2/17/2022  2:45 PM Andrews Christiansonolla, PT [facility]   2/22/2022  2:45 PM Katarzyna Rumps [facility]   2/24/2022  2:45 PM Andrews Christiansonolla, PT [facility]

## 2022-01-28 ENCOUNTER — HOSPITAL ENCOUNTER (OUTPATIENT)
Dept: PHYSICAL THERAPY | Age: 52
Discharge: HOME | End: 2022-01-28
Payer: OTHER GOVERNMENT

## 2022-01-28 PROCEDURE — 97110 THERAPEUTIC EXERCISES: CPT

## 2022-01-28 PROCEDURE — 97112 NEUROMUSCULAR REEDUCATION: CPT

## 2022-01-28 NOTE — PROGRESS NOTES
PT DAILY TREATMENT NOTE     Patient Name: Adria Izquierdo  FJKJ:  : 1970  [x]  Patient  Verified  Payor: [facility] CCN / Plan: [facility] CCN / Product Type: Federal Funded Programs /    In time: 1:30 Out time: 2:19  Total Treatment Time (min):49  Visit #: 4 of 10    Medicare/BCBS Only   Total Timed Codes (min):  39 1:1 Treatment Time:         Treatment Area: Cervicalgia [M54.2]  Left shoulder pain [M25.512]    SUBJECTIVE  Pain Level (0-10 scale): 0/10  Any medication changes, allergies to medications, adverse drug reactions, diagnosis change, or new procedure performed?: [x] No    [] Yes (see summary sheet for update)  Subjective functional status/changes:   [] No changes reported  Pt reports she felt fine after her last session, no inc in pain or mm soreness    OBJECTIVE    Modality rationale: decrease pain and increase tissue extensibility to improve the patients ability to perform ADL's, reaching, lifting    Min Type Additional Details    [] Estim:  []Unatt       []IFC  []Premod                        []Other:  []w/ice   []w/heat  Position:  Location:    [] Estim: []Att    []TENS instruct  []NMES                    []Other:  []w/US   []w/ice   []w/heat  Position:  Location:    []  Traction: [] Cervical       []Lumbar                       [] Prone          []Supine                       []Intermittent   []Continuous Lbs:  [] before manual  [] after manual    []  Ultrasound: []Continuous   [] Pulsed                           []1MHz   []3MHz Location:  W/cm2:    []  Iontophoresis with dexamethasone         Location: [] Take home patch   [] In clinic   10 []  Ice     [x]  heat  []  Ice massage  []  Laser   []  Anodyne Position: supine with wedge  Location: CS and L shoulder     []  Laser with stim  []  Other: Position:  Location:    []  Vasopneumatic Device  Pre-treatment girth:  Post-treatment girth:  Measured at (location):  Pressure:       [] lo [] med [] hi Temperature: [] lo [] med [] hi   [x] Skin assessment post-treatment:  [x]intact []redness- no adverse reaction    []redness - adverse reaction:       27 min Therapeutic Exercise:  [x] See flow sheet :   Rationale: increase ROM and increase strength to improve the patients ability to perform ADLs with improved shoulder/elbow strength and mobility. 12 min Neuromuscular Re-education:  [x]  See flow sheet :   Rationale: increase ROM, increase strength, improve coordination, improve balance and increase proprioception  to improve the patients ability to perform overhead functional lifts with improved scapular stabilization and neutral cervical posture. With   [x] TE   [] TA   [] neuro   [] other: Patient Education: [x] Review HEP    [] Progressed/Changed HEP based on:   [] positioning   [] body mechanics   [] transfers   [] heat/ice application    [] other:      Other Objective/Functional Measures: Added diagonals and sidelying shoulder flexion    Pain Level (0-10 scale) post treatment: 0     ASSESSMENT/Changes in Function:   Pt continued with progressions from last visit without complaint, she required moderate verbal cues to perform exercises correctly with proper form. Pt was able to tolerate inc repetitions with wall push ups evidencing improving strength. Pt was instructed in diagonals for improved periscapular strength for overhead lifting/reaching. Pt lacked sufficient strength for L diagonals with red band so band was changed to yellow, and therapist applied min assist to aid with keeping proper form. Pt was also instructed in side lying shoulder flexion to continue to improve functional strength for overhead lifting. Pt required vc and tc to perform shoulder external rotation to avoid elbow extension and shoulder abduction.  PT plans to add yellow thera band to side lying shoulder flexion as tolerated  Patient will continue to benefit from skilled PT services to modify and progress therapeutic interventions, address functional mobility deficits, address ROM deficits, address strength deficits, analyze and address soft tissue restrictions, analyze and cue movement patterns, analyze and modify body mechanics/ergonomics, assess and modify postural abnormalities, address imbalance/dizziness and instruct in home and community integration to attain remaining goals. [x]  See Plan of Care  []  See progress note/recertification  []  See Discharge Summary         Progress towards goals / Updated goals:  Short Term Goals: To be accomplished in 1 week  - Goal: Pt to be compliant with initial HEP to improve shoulder range of motion and pain. Status at last note/certification: Established and reviewed with Pt  Current: met, pt reports good compliance (1/18/22)  Long Term Goals: To be accomplished in 10 treatments  - Goal: Pt to demonstrate 5/5 shoulder flexion MMT of left shoulder to improve overhead lifting ability   Status at last note/certification: 4-/5 shoulder flexion MMT  - Goal: Pt to demonstrate at least 160 deg of B shoulder flexion AROM in seated position to facilitate ease of donning/doffing clothes, overhead lifting, and grooming tasks. 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 Status at last note/certification: 6 seconds  - Goal: Pt to report FOTO score of at least 61 pts to show improved function and quality of life.   Status at last note/certification: FOTO 52 pts     PLAN  [x]  Upgrade activities as tolerated     [x]  Continue plan of care  []  Update interventions per flow sheet       []  Discharge due to:_  []  Other:_      Luis Dys 1/28/2022  10:33 AM    Future Appointments   Date Time Provider Buddy [place]   1/28/2022  1:30 PM Betty Andrews [facility]   2/1/2022  2:00 PM Felix Lezama., PT [facility]   2/3/2022  2:00 PM Felix Lezama., PT [facility]   2/8/2022  2:45 PM Eusebio Verma [facility]   2/10/2022  2:45 PM Emiliana Torrez, PT [facility]   2/15/2022  2:45 PM Eusebio Peasant [facility]   2/17/2022  2:45 PM Emiliana Torrez, PT [facility]   2/22/2022  2:45 PM Eusebio Mcallistersant [facility]   2/24/2022  2:45 PM Emiliana Torrez, PT [facility]

## 2022-02-01 ENCOUNTER — HOSPITAL ENCOUNTER (OUTPATIENT)
Dept: PHYSICAL THERAPY | Age: 52
Discharge: HOME | End: 2022-02-01
Payer: OTHER GOVERNMENT

## 2022-02-01 PROCEDURE — 97112 NEUROMUSCULAR REEDUCATION: CPT

## 2022-02-01 PROCEDURE — 97110 THERAPEUTIC EXERCISES: CPT

## 2022-02-01 NOTE — PROGRESS NOTES
PT DAILY TREATMENT NOTE     Patient Name: Sushant Gamble  [id number]  : 1970  [x]  Patient  Verified  Payor: [facility] CCN / Plan: [facility] CCN / Product Type: Federal Funded Programs /    In [employer] time: 250  Total Treatment Time (min):54  Visit #: 5 of 10    Treatment Area: Cervicalgia [M54.2]  Left shoulder pain [M25.512]    SUBJECTIVE  Pain Level (0-10 scale): 0/10  Any medication changes, allergies to medications, adverse drug reactions, diagnosis change, or new procedure performed?: [x] No    [] Yes (see summary sheet for update)  Subjective functional status/changes:   [] No changes reported  Pt reports no pain but strength deficits    OBJECTIVE    Modality rationale: decrease pain and increase tissue extensibility to improve the patients ability to perform ADL's, reaching, lifting    Min Type Additional Details    [] Estim:  []Unatt       []IFC  []Premod                        []Other:  []w/ice   []w/heat  Position:  Location:    [] Estim: []Att    []TENS instruct  []NMES                    []Other:  []w/US   []w/ice   []w/heat  Position:  Location:    []  Traction: [] Cervical       []Lumbar                       [] Prone          []Supine                       []Intermittent   []Continuous Lbs:  [] before manual  [] after manual    []  Ultrasound: []Continuous   [] Pulsed                           []1MHz   []3MHz Location:  W/cm2:    []  Iontophoresis with dexamethasone         Location: [] Take home patch   [] In clinic   10 []  Ice     [x]  heat  []  Ice massage  []  Laser   []  Anodyne Position: supine with wedge  Location: CS and L shoulder     []  Laser with stim  []  Other: Position:  Location:    []  Vasopneumatic Device  Pre-treatment girth:  Post-treatment girth:  Measured at (location):  Pressure:       [] lo [] med [] hi   Temperature: [] lo [] med [] hi   [x] Skin assessment post-treatment:  [x]intact []redness- no adverse reaction    []redness - adverse reaction:       20 min Therapeutic Exercise:  [x] See flow sheet :   Rationale: increase ROM and increase strength to improve the patients ability to perform ADLs with improved shoulder/elbow strength and mobility for return to work duties . 24 min Neuromuscular Re-education:  [x]  See flow sheet :   Rationale: increase ROM, increase strength, improve coordination, improve balance and increase proprioception  to improve the patients ability to perform overhead functional lifts with improved scapular stabilization and neutral cervical posture. With   [x] TE   Patient Education: [x] Review HEP  - DOMS, MHP at home     Other Objective/Functional Measures:    Deficits; lifting / reaching with any wt   Improvement: good albeit slow progress in reaching ability  Pain - min to no pain involved      Pain Level (0-10 scale) post treatment: 0     ASSESSMENT/Changes in Function:   Pt reports that she is seeing the benefit of PT overall and she is compliant with HEP. Pain is not a primary concern at this time, just strength, beltran in the extended and overhead positions. Cont VCing throughout treatment for posture of scap and CS. Significantly challenged by repeated overhead reaching with decreased endurance as well as diagonal patterns with noted compensatory patterns sec to strength deficits. Patient will continue to benefit from skilled PT services to modify and progress therapeutic interventions, address functional mobility deficits, address ROM deficits, address strength deficits, analyze and address soft tissue restrictions, analyze and cue movement patterns, analyze and modify body mechanics/ergonomics, assess and modify postural abnormalities, address imbalance/dizziness and instruct in home and community integration to attain remaining goals.      [x]  See Plan of Care  []  See progress note/recertification  []  See Discharge Summary         Progress towards goals / Updated goals:  Short Term Goals: To be accomplished in 1 week  - Goal: Pt to be compliant with initial HEP to improve shoulder range of motion and pain. Status at last note/certification: Established and reviewed with Pt  Current: met, pt reports good compliance (1/18/22)    Long Term Goals: To be accomplished in 10 treatments  - Goal: Pt to demonstrate 5/5 shoulder flexion MMT of left shoulder to improve overhead lifting ability   Status at last note/certification: 4-/5 shoulder flexion MMT    - Goal: Pt to demonstrate at least 160 deg of B shoulder flexion AROM in seated position to facilitate ease of donning/doffing clothes, overhead lifting, and grooming tasks. 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 Status at last note/certification: 6 seconds  Current : progressing goal as evident by improved tolerance to PRE progression 2/1/2022    - Goal: Pt to report FOTO score of at least 61 pts to show improved function and quality of life.   Status at last note/certification: FOTO 52 pts     PLAN  [x]  Upgrade activities as tolerated     [x]  Continue plan of care  []  Update interventions per flow sheet       []  Discharge due to:_  [x]  Other:_cont OH strengthening      Margarita León, PT 2/1/2022  10:33 AM    Future Appointments   Date Time Provider Bdudy [place]   2/1/2022  2:00 PM Eli Pope, PT [facility]   2/3/2022  2:00 PM Eli Pope, PT [facility]   2/8/2022  2:45 PM Tiffanie Moses [facility]   2/10/2022  2:45 PM Eleazar Davis PT [facility]   2/15/2022  2:45 PM Tiffanie Josué [facility]   2/17/2022  2:45 PM Eleazar Davis PT [facility]   2/22/2022  2:45 PM Tiffanie [facility]   2/24/2022 2:45 PM Patti De, PT [facility]

## 2022-02-03 ENCOUNTER — HOSPITAL ENCOUNTER (OUTPATIENT)
Dept: PHYSICAL THERAPY | Age: 52
Discharge: HOME | End: 2022-02-03
Payer: OTHER GOVERNMENT

## 2022-02-03 PROCEDURE — 97112 NEUROMUSCULAR REEDUCATION: CPT | Performed by: GENERAL ACUTE CARE HOSPITAL

## 2022-02-03 PROCEDURE — 97110 THERAPEUTIC EXERCISES: CPT | Performed by: GENERAL ACUTE CARE HOSPITAL

## 2022-02-03 NOTE — PROGRESS NOTES
PT DAILY TREATMENT NOTE     Patient Name: Agusto Almonte  [id number]  : 1970  [x]  Patient  Verified  Payor: [facility] CCN / Plan: [facility] CCN / Product Type: Federal Funded Programs /    In time:  2:01  Out time: 2:43   Total Treatment Time (min): 42  Visit #: 6 of 10    Treatment Area: Cervicalgia [M54.2]  Left shoulder pain [M25.512]    SUBJECTIVE  Pain Level (0-10 scale): 0/10   Any medication changes, allergies to medications, adverse drug reactions, diagnosis change, or new procedure performed?: [x] No    [] Yes (see summary sheet for update)  Subjective functional status/changes:   [] No changes reported  Pt reports gradual improvements in lifting/reaching overhead    OBJECTIVE    Modality rationale: decrease pain and increase tissue extensibility to improve the patients ability to perform ADL's, reaching, lifting    Min Type Additional Details    [] Estim:  []Unatt       []IFC  []Premod                        []Other:  []w/ice   []w/heat  Position:  Location:    [] Estim: []Att    []TENS instruct  []NMES                    []Other:  []w/US   []w/ice   []w/heat  Position:  Location:    []  Traction: [] Cervical       []Lumbar                       [] Prone          []Supine                       []Intermittent   []Continuous Lbs:  [] before manual  [] after manual    []  Ultrasound: []Continuous   [] Pulsed                           []1MHz   []3MHz Location:  W/cm2:    []  Iontophoresis with dexamethasone         Location: [] Take home patch   [] In clinic   PD []  Ice     [x]  heat  []  Ice massage  []  Laser   []  Anodyne Position: supine with wedge  Location: CS and L shoulder     []  Laser with stim  []  Other: Position:  Location:    []  Vasopneumatic Device  Pre-treatment girth:  Post-treatment girth:  Measured at (location):  Pressure:       [] lo [] med [] hi   Temperature: [] lo [] med [] hi   [x] Skin assessment post-treatment:  [x]intact []redness- no adverse reaction    []redness - adverse reaction:       18 min Therapeutic Exercise:  [x] See flow sheet :   Rationale: increase ROM and increase strength to improve the patients ability to perform ADLs with improved shoulder/elbow strength and mobility for return to work duties . 24 min Neuromuscular Re-education:  [x]  See flow sheet :   Rationale: increase ROM, increase strength, improve coordination, improve balance and increase proprioception  to improve the patients ability to perform overhead functional lifts with improved scapular stabilization and neutral cervical posture. With   [x] TE   Patient Education: [x] Review HEP  - DOMS, MHP at home     Other Objective/Functional Measures:    Progressed to single arm rows and extension at [facility]   Increased reps when able to tolerate     Pain Level (0-10 scale) post treatment: 0      ASSESSMENT/Changes in Function: Good tolerance to exercise progressions today, but continues to require VC occasionally for correct performance. Pt benefited from [facility] for \"squeezing\" chest at top of bench press for pect activation. Pt making progress with overhead strengthening but continues to fatigue easily. Declined modalities today. Patient will continue to benefit from skilled PT services to modify and progress therapeutic interventions, address functional mobility deficits, address ROM deficits, address strength deficits, analyze and address soft tissue restrictions, analyze and cue movement patterns, analyze and modify body mechanics/ergonomics, assess and modify postural abnormalities, address imbalance/dizziness and instruct in home and community integration to attain remaining goals. [x]  See Plan of Care  []  See progress note/recertification  []  See Discharge Summary         Progress towards goals / Updated goals:  Short Term Goals:  To be accomplished in 1 week  - Goal: Pt to be compliant with initial HEP to improve shoulder range of motion and pain.  Status at last note/certification: Established and reviewed with Pt  Current: met, pt reports good compliance (1/18/22)    Long Term Goals: To be accomplished in 10 treatments  - Goal: Pt to demonstrate 5/5 shoulder flexion MMT of left shoulder to improve overhead lifting ability   Status at last note/certification: 4-/5 shoulder flexion MMT    - Goal: Pt to demonstrate at least 160 deg of B shoulder flexion AROM in seated position to facilitate ease of donning/doffing clothes, overhead lifting, and grooming tasks. 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 Status at last note/certification: 6 seconds  Current : progressing goal as evident by improved tolerance to PRE progression 2/1/2022    - Goal: Pt to report FOTO score of at least 61 pts to show improved function and quality of life.   Status at last note/certification: FOTO 52 pts     PLAN  [x]  Upgrade activities as tolerated     [x]  Continue plan of care  []  Update interventions per flow sheet       []  Discharge due to:_  []  Other:_    Bishop Santoro, PT 2/3/2022  10:33 AM    Future Appointments   Date Time Provider Buddy [place]   2/8/2022  2:45 PM Lisa Almeida [facility]   2/10/2022  2:45 PM Mode Jin PT [facility]   2/15/2022  2:45 PM Lisa UNGER [facility]   2/17/2022  2:45 PM CHIQUITA [facility]   2/22/2022  2:45 PM Lisa UNGER [facility]   2/24/2022  2:45 PM Mode Jin PT [facility]

## 2022-02-08 ENCOUNTER — APPOINTMENT (OUTPATIENT)
Dept: PHYSICAL THERAPY | Age: 52
End: 2022-02-08
Payer: OTHER GOVERNMENT

## 2022-02-10 ENCOUNTER — HOSPITAL ENCOUNTER (OUTPATIENT)
Dept: PHYSICAL THERAPY | Age: 52
Discharge: HOME | End: 2022-02-10
Payer: OTHER GOVERNMENT

## 2022-02-10 PROCEDURE — 97110 THERAPEUTIC EXERCISES: CPT

## 2022-02-10 PROCEDURE — 97112 NEUROMUSCULAR REEDUCATION: CPT

## 2022-02-10 NOTE — PROGRESS NOTES
PT DAILY TREATMENT NOTE     Patient Name: Thom Smyth  RIGK:[id number]  : 1970  [x]  Patient  Verified  Payor: [facility] CCN / Plan: [facility] / Product Type: Federal Funded Programs /    In time:2:54  Out time:3:48  Total Treatment Time (min): 54  Total Timed Codes (min): 54  1:1 Treatment Time (min): 54   Visit #: 7 of 10    Treatment Area: Cervicalgia [M54.2]  Left shoulder pain [M25.512]    SUBJECTIVE  Pain Level (0-10 scale): 3-4  Any medication changes, allergies to medications, adverse drug reactions, diagnosis change, or new procedure performed?: [x] No    [] Yes (see summary sheet for update)  Subjective functional status/changes:   [] No changes reported  Pt was 9 min late for treatment  The dough machine stopped working so I had to roll the dough on my own. I've tried out new pillows to help my neck feel better. OBJECTIVE      44 min Therapeutic Exercise:  [x] See flow sheet : reassessment for PN ; started on the UBE   Rationale: increase ROM and increase strength to improve the patients ability to improve reaching, self-care, work     10 min Neuromuscular Re-education:  []  See flow sheet :   Rationale: improve coordination and increase proprioception  to improve the patients ability to reaching, work at [employer]           x min Patient Education: [x] Review HEP    [] Progressed/Changed HEP based on:   Notes HEP still difficult on days she works.    [] positioning   [] body mechanics   [] transfers   [] heat/ice application        Other Objective/Functional Measures:       Pain- averaget: 1-2/10     Worst: 10/10   Best: 010  20-30 % improvement  Aggravated By: sleeping on left side (increased neck pain); sustained C/S flexion  Alleviated By: medications  Limitations to PLOF:  difficulty completing hair care, decreased strength/endurance in left shoulder; unhook bra strap  Functional improvements: improved reaching and use of lifting weight OH and further into flexion;more strength noted; improved work tolerance    Pt Goals: improve strength; gain ROM ( that will come from strength)     Observation: forward head  Palpation: TTP at B cervical paraspinals     Shoulder AROM/PROM:                                           AROM (deg)              PROM (deg)    Right Left Right Left   Seated Flexion  156 148 --------------- ---------------      Functional ER: right T2, left T1 (slightly laterally)  Functional IR: R midthoracic spine, L T8     C/S Screening:   Flexion 26 deg, ext 50, right rotation 60 deg, left rotation 61 deg                Strength:    Right (/5) Left (/5)   GHJ   Flexion 5 4-             Abduction 5 4             Extension 5 5             ER 5 4             IR 5 4+   Serratus anterior  4  3+   Middle/Lower Trap       Elbow Flexion 5 5   Elbow Extension 5 4    Strength   NT      90 deg Flexion Endurance right 80\", 20\" on left (with 2# weight for goal)      Joint Feel: some stiffness but not expected firm end feel  Scapulohumeral Rhythm: increased left UT involvement and trunk lean for elevation of the L GHJ      Reflexes/Sensation: reports no sensation changes since second surgery      Pain Level (0-10 scale) post treatment: 7    ASSESSMENT/Changes in Function: see PN     Patient will continue to benefit from skilled PT services to modify and progress therapeutic interventions, address functional mobility deficits, address ROM deficits, address strength deficits, analyze and address soft tissue restrictions, analyze and cue movement patterns, analyze and modify body mechanics/ergonomics, assess and modify postural abnormalities, address imbalance/dizziness and instruct in home and community integration to attain remaining goals. []  See Plan of Care  [x]  See progress note/recertification  []  See Discharge Summary         Progress towards goals / Updated goals:  Short Term Goals:  To be accomplished in 1 week  - Goal: Pt to be compliant with initial HEP to improve shoulder range of motion and pain. Status at last note/certification: Established and reviewed with Pt  Current: met, pt reports good compliance (2/10/22)     Long Term Goals: To be accomplished in 10 treatments  - Goal: Pt to demonstrate 5/5 shoulder flexion MMT of left shoulder to improve overhead lifting ability   Status at last note/certification: 4-/5 shoulder flexion MMT   Current: progressing slowly as appropriate for 6-8 weeks for true mm strength/hypertrophy (2/10/22)    - Goal: Pt to demonstrate at least 160 deg of B shoulder flexion AROM in seated position to facilitate ease of donning/doffing clothes, overhead lifting, and grooming tasks. 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 Status at last note/certification: 6 seconds  Current : progressing to 20\" (2/10/22)     - Goal: Pt to report FOTO score of at least 61 pts to show improved function and quality of life. Status at last note/certification: MYVU 62 IYG   Goal met at 65/100 (2/10/22)    PLAN  [x]  Upgrade activities as tolerated     [x]  Continue plan of care  []  Update interventions per flow sheet       []  Discharge due to:_  [x]  Other:_  Recommend con't with PT 2x/week for 8-12 sessions to address deficits.    Consider [employer] for some PNFs as able  Consider decreasing to 8# on rows and increasing reps   Start ER at 90/90 for strength for bra strap ebth Kulkarni, PT 2/10/2022  9:51 AM    Future Appointments   Date Time Provider Buddy [place]   2/10/2022  2:45 PM Namrata Amezquita, PT [facility]   2/15/2022  2[facility]   2/17/2022  2:45 PM Namrata Amezquita PT [facility]   2/22/2022  2:45 PM Isabela Ortiz [facility]   2/24/2022  2:45 PM Chandra Dubon PT [facility]   3/1/2022  2:00 PM Jagdish Robledo [facility]

## 2022-02-10 NOTE — PROGRESS NOTES
[address] [facility] [address] [facility] AT [address] [place]  Phone: [phone] Fax: [phone]  PROGRESS NOTE  Patient Name: Linda Zapata : [birth year]   Treatment/Medical Diagnosis: Cervicalgia [M54.2]  Left shoulder pain [M25.512]   Referral Source: Mariana Carbajal MD     Date of Initial Visit: 2022 Attended Visits: 7 Missed Visits: 1     SUMMARY OF TREATMENT  Pt is a pleasant 54 y.o. female who presents with c/o neck pain and left shoulder pain/weakness. The patient reports an extensive surgical history in the past 6 months impacting her condition. Ther ex including strengthening, ROM, flexibility, stabilization; TherAct for OH reaching; stability training; moist heat as needed; postural education; Patient education; HEP  CURRENT STATUS  Pt is making good progress in therapy. She notes a decrease in ave pain, improved strength, ROM and improved self-care. 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 She will con't to benefit from skilled PT services to address deficits.      Pain- averaget: 1-2/10     Worst: 10/10   Best: 010  20-30 % improvement  Aggravated By: sleeping on left side (increased neck pain); sustained C/S flexion  Alleviated By: medications  Limitations to PLOF:  difficulty completing hair care, decreased strength/endurance in left shoulder; unhook bra strap  Functional improvements: improved reaching and use of lifting weight OH and further into flexion;more strength noted; improved work tolerance  FOTO 65/100     Pt Goals: improve strength; gain ROM ( that will come from strength)     Observation: forward head  Palpation: TTP at B cervical paraspinals     Shoulder AROM/PROM:                                           AROM (deg)              PROM (deg)    Right Left Right Left   Seated Flexion  156 148 --------------- ---------------      Functional ER: right T2, left T1 (slightly laterally)  Functional IR: R midthoracic spine, L T8     C/S Screening:   Flexion 26 deg, ext 50, right rotation 60 deg, left rotation 61 deg                Strength:    Right (/5) Left (/5)   GHJ   Flexion 5 4-             Abduction 5 4             Extension 5 5             ER 5 4             IR 5 4+   Serratus anterior  4  3+   Middle/Lower Trap       Elbow Flexion 5 5   Elbow Extension 5 4    Strength   NT      90 deg Flexion Endurance right 80\", 20\" on left (with 2# weight for goal)      Joint Feel: some stiffness but not expected firm end feel  Scapulohumeral Rhythm: increased left UT involvement and trunk lean for elevation of the L GHJ      Reflexes/Sensation: reports no sensation changes since second surgery    Goals for this period include:    Short Term Goals: To be accomplished in 1 week  - Goal: Pt to be compliant with initial HEP to improve shoulder range of motion and pain. Status at last note/certification: Established and reviewed with Pt  Current: met, pt reports good compliance (2/10/22)     Long Term Goals: To be accomplished in 10 treatments  - Goal: Pt to demonstrate 5/5 shoulder flexion MMT of left shoulder to improve overhead lifting ability   Status at last note/certification: 4-/5 shoulder flexion MMT   Current: progressing slowly as appropriate for 6-8 weeks for true mm strength/hypertrophy (2/10/22)    - Goal: Pt to demonstrate at least 160 deg of B shoulder flexion AROM in seated position to facilitate ease of donning/doffing clothes, overhead lifting, and grooming tasks. 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   Status at last note/certification: 6 seconds  Current : progressing to 20\" (2/10/22)     - Goal: Pt to report FOTO score of at least 61 pts to show improved function and quality of life. Status at last note/certification: KUZ 74 VVS   Goal met at 65/100 (2/10/22)      New Goals to be achieved in __8-12__  treatments:  1. Goal: Pt to be compliant with initial HEP to improve shoulder range of motion and pain. Status at last note/certification: Established and reviewed with Pt  Current: met, pt reports good compliance (2/10/22)   2) Goal: Pt to demonstrate 5/5 shoulder flexion MMT of left shoulder to improve overhead lifting ability   Status at last note/certification: 4-/5 shoulder flexion MMT   Current: progressing slowly as appropriate for 6-8 weeks for true mm strength/hypertrophy (2/10/22)  3)  Goal: Pt to demonstrate at least 160 deg of B shoulder flexion AROM in seated position to facilitate ease of donning/doffing clothes, overhead lifting, and grooming tasks. 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 Status at last note/certification: 6 seconds  Current : progressing to 20\" (2/10/22)    RECOMMENDATIONS  Recommend con't with PT 2x/week for 8-12 sessions to address deficits. If you have any questions/comments please contact us directly at [phone]. Thank you for allowing us to assist in the care of your patient. Therapist Signature: Rahul Garzon DPT Date: 2/10/2022   Reporting period: Na  Time: 9:53 AM   NOTE TO PHYSICIAN:  PLEASE COMPLETE THE ORDERS BELOW AND FAX TO   [facility]: [phone].   If you are unable to process this request in 24 hours please contact our office: [phone].  ___ I have read the above report and request that my patient continue as recommended.   ___ I have read the above report and request that my patient continue therapy with the following changes/special instructions:_________________________________________________________   ___ I have read the above report and request that my patient be discharged from therapy.      Physician Signature:        Date:       Time:

## 2022-02-15 ENCOUNTER — APPOINTMENT (OUTPATIENT)
Dept: PHYSICAL THERAPY | Age: 52
End: 2022-02-15
Payer: OTHER GOVERNMENT

## 2022-02-17 ENCOUNTER — HOSPITAL ENCOUNTER (OUTPATIENT)
Dept: PHYSICAL THERAPY | Age: 52
Discharge: HOME | End: 2022-02-17
Payer: OTHER GOVERNMENT

## 2022-02-17 PROCEDURE — 97110 THERAPEUTIC EXERCISES: CPT

## 2022-02-17 PROCEDURE — 97140 MANUAL THERAPY 1/> REGIONS: CPT

## 2022-02-17 PROCEDURE — 97112 NEUROMUSCULAR REEDUCATION: CPT

## 2022-02-17 NOTE — PROGRESS NOTES
PT DAILY TREATMENT NOTE     Patient Name: Soto Collins  LDVF:  : 1970  [x]  Patient  Verified  Payor: [facility] / [facility] / Product Type: Federal Funded Programs /    In time:2:45  Out time:3:27  Total Treatment Time (min): 42  Total Timed Codes (min): 42  1:1 Treatment Time (min): 42   Visit #: 1 of     Treatment Area: Cervicalgia [M54.2]  Left shoulder pain [M25.512]    SUBJECTIVE  Pain Level (0-10 scale): 0  Any medication changes, allergies to medications, adverse drug reactions, diagnosis change, or new procedure performed?: [x] No    [] Yes (see summary sheet for update)  Subjective functional status/changes:   [] No changes reported  Was sore after last session but only for a day. I'm lifting more at work with less discomfort. Lifting more for longer periods too. Will be having internal surgery on 3/14 and will have lifting restrictions. OBJECTIVE    32 min Therapeutic Exercise:  [x] See flow sheet :  Increased 's pose.    Push ups moved to high table; added 3 way push ups (wide and narrow)  SL ER: can't push to 3# as pt demo's elbow extension compensations   SL abd up to 3#    Rationale: increase ROM and increase strength to improve the patients ability to reach, self-care, work        10 min Neuromuscular Re-education:  [x]  See flow sheet :   Rationale: improve coordination and increase proprioception  to improve the patients ability to reaching, work at [employer]             x min Patient Education: [x] Review HEP    [] Progressed/Changed HEP based on:   Progressed HEP  Added yellow tband for home for standing PNFs     [] positioning   [] body mechanics   [] transfers   [] heat/ice application        Other Objective/Functional Measures:     PNF D2 flexion: 7x with yellow before fatigue; 2nd set: 8x  PNF D1 flexion: 10x    Pain Level (0-10 scale) post treatment: 0    ASSESSMENT/Changes in Function: requires VC throughout session for C/S retraction and for avoidance of shoulder hike compensations but overall improving each session with knowledge of avoiding compensations. Pt with difficulty utilizing pec mm's for push exercises due to hx of heart/chest surgery/involvement. L shoulder very fatigued in abduction at end of session. Patient will continue to benefit from skilled PT services to modify and progress therapeutic interventions, address functional mobility deficits, address ROM deficits, address strength deficits, analyze and address soft tissue restrictions, analyze and cue movement patterns, analyze and modify body mechanics/ergonomics, assess and modify postural abnormalities, address imbalance/dizziness and instruct in home and community integration to attain remaining goals. []  See Plan of Care  [x]  See progress note/recertification  []  See Discharge Summary         Progress towards goals / Updated goals:  1. Goal: Pt to be compliant with initial HEP to improve shoulder range of motion and pain. Status at last note/certification: Established and reviewed with Pt  Current: met, pt reports good compliance (2/10/22)   2) Goal: Pt to demonstrate 5/5 shoulder flexion MMT of left shoulder to improve overhead lifting ability   Status at last note/certification: 4-/5 shoulder flexion MMT   Current: progressed TE to tolerance today  (2/17/22)  3)  Goal: Pt to demonstrate at least 160 deg of B shoulder flexion AROM in seated position to facilitate ease of donning/doffing clothes, overhead lifting, and grooming tasks. 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   Status at last note/certification: 6 seconds  Current : added seated ER at 90/90 (2/17/22)    PLAN  [x]  Upgrade activities as tolerated     [x]  Continue plan of care  []  Update interventions per flow sheet       []  Discharge due to:_  [x]  Other:_  Progress HEP as able; vary the order of TE to address various fatigue in mm groups     Prasad Lundberg, PT 2/17/2022  10:25 AM    Future Appointments   Date Time Provider Buddy [place]   2/17/2022  2:45 PM Dorothy Si, PT [facility]   2/22/2022  2:45 PM Jeb Hillman [facility]   2/24/2022  2:45 PM Dorothy Si, PT [facility]   3/1/2022  2:00 PM Jeb Hillman [facility]   3/3/2022  2:45 PM Dorothy Si, PT [facility]

## 2022-02-22 ENCOUNTER — HOSPITAL ENCOUNTER (OUTPATIENT)
Dept: PHYSICAL THERAPY | Age: 52
Discharge: HOME | End: 2022-02-22
Payer: OTHER GOVERNMENT

## 2022-02-22 PROCEDURE — 97112 NEUROMUSCULAR REEDUCATION: CPT

## 2022-02-22 PROCEDURE — 97110 THERAPEUTIC EXERCISES: CPT

## 2022-02-24 ENCOUNTER — APPOINTMENT (OUTPATIENT)
Dept: PHYSICAL THERAPY | Age: 52
End: 2022-02-24
Payer: OTHER GOVERNMENT

## 2022-03-01 ENCOUNTER — HOSPITAL ENCOUNTER (OUTPATIENT)
Dept: PHYSICAL THERAPY | Age: 52
Discharge: HOME | End: 2022-03-01
Payer: OTHER GOVERNMENT

## 2022-03-01 PROCEDURE — 97110 THERAPEUTIC EXERCISES: CPT

## 2022-03-01 PROCEDURE — 97112 NEUROMUSCULAR REEDUCATION: CPT

## 2022-03-01 NOTE — PROGRESS NOTES
PT DAILY TREATMENT NOTE     Patient Name: Lauren Brothers  LAVU:  : 1970  [x]  Patient  Verified  Payor: [facility] CCN / [facility] / Product Type: Federal Funded Programs /    In time:2:16  Out time:2:56  Total Treatment Time (min): 40  Visit #: 3 of     Medicare/BCBS Only   Total Timed Codes (min):   1:1 Treatment Time:         Treatment Area: Cervicalgia [M54.2]  Left shoulder pain [M25.512]    SUBJECTIVE  Pain Level (0-10 scale): 0  Any medication changes, allergies to medications, adverse drug reactions, diagnosis change, or new procedure performed?: [x] No    [] Yes (see summary sheet for update)  Subjective functional status/changes:   [] No changes reported  \"I feel like I am still improving but just very slowly now, like I have hit a plateau. \"  Pt arrives 15 min late for appointment today    OBJECTIVE    30 min Therapeutic Exercise:  [x] See flow sheet :   Rationale: increase ROM and increase strength to improve the patients ability to perform ADLs with improved shoulder/elbow strength and mobility. 10 min Neuromuscular Re-education:  [x]  See flow sheet :   Rationale: increase ROM, increase strength, improve coordination, improve balance and increase proprioception  to improve the patients ability to perform overhead functional lifts with improved scapular stabilization and neutral cervical posture.        With   [x] TE   [] TA   [x] neuro   [] other: Patient Education: [x] Review HEP    [] Progressed/Changed HEP based on:   [] positioning   [] body mechanics   [] transfers   [] heat/ice application    [] other:      Other Objective/Functional Measures: -Shortened session per pt late arrival   -Shoulder flexion endurance - 24\" at 90 deg with 2 lb weight    Pain Level (0-10 scale) post treatment: 0    ASSESSMENT/Changes in Function: Patient reports increased pectoral muscle recruitment when cued to adduct her left shoulder closer to her side when performing supine chest press. She demonstrates improved shoulder flexion AROM in seated position and slightly improved shoulder flexion endurance. Patient will continue to benefit from skilled PT services to modify and progress therapeutic interventions, address functional mobility deficits, address ROM deficits, address strength deficits, analyze and address soft tissue restrictions, analyze and cue movement patterns, analyze and modify body mechanics/ergonomics, assess and modify postural abnormalities, address imbalance/dizziness and instruct in home and community integration to attain remaining goals. []  See Plan of Care  []  See progress note/recertification  []  See Discharge Summary         Progress towards goals / Updated goals:  1. Goal: Pt to be compliant with initial HEP to improve shoulder range of motion and pain. Status at last note/certification: Established and reviewed with Pt  Current: met, pt reports good compliance (2/10/22)   2) Goal: Pt to demonstrate 5/5 shoulder flexion MMT of left shoulder to improve overhead lifting ability   Status at last note/certification: 4-/5 shoulder flexion MMT   Current: progressed TE to tolerance today  (2/17/22)  3)  Goal: Pt to demonstrate at least 160 deg of B shoulder flexion AROM in seated position to facilitate ease of donning/doffing clothes, overhead lifting, and grooming tasks. 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   Status at last note/certification: 20 seconds  Current : progressing, 24\" (3/1/22)    PLAN  [x]  Upgrade activities as tolerated     [x]  Continue plan of care  []  Update interventions per flow sheet       []  Discharge due to:_  []  Other:_      Tina Rob [facility] 3/1/2022  11:27 AM    Future Appointments   Date Time Provider Buddy [place]   3/1/2022  2:00 PM Hany Flores [facility] [address]   3/3/2022  2:45 PM Janina Tsang PT [facility] [address]   3/8/2022 10:45 AM Mata Flowers PTA [facility] [address]

## 2022-03-03 ENCOUNTER — APPOINTMENT (OUTPATIENT)
Dept: PHYSICAL THERAPY | Age: 52
End: 2022-03-03
Payer: OTHER GOVERNMENT

## 2022-03-08 ENCOUNTER — HOSPITAL ENCOUNTER (OUTPATIENT)
Dept: PHYSICAL THERAPY | Age: 52
Discharge: HOME | End: 2022-03-08
Payer: OTHER GOVERNMENT

## 2022-03-08 PROCEDURE — 97110 THERAPEUTIC EXERCISES: CPT

## 2022-03-08 PROCEDURE — 97112 NEUROMUSCULAR REEDUCATION: CPT

## 2022-03-08 NOTE — PROGRESS NOTES
PT DAILY TREATMENT NOTE     Patient Name: Lauren Brothers  BYOM:  : 1970  [x]  Patient  Verified  Payor: [facility] CCN / Plan: [facility] CCN / Product Type: Federal Funded Programs /    In time: 10:46  Out time:  11:27  Total Treatment Time (min):  41  Visit #: 4 of     Medicare/BCBS Only   Total Timed Codes (min):   1:1 Treatment Time:         Treatment Area: Cervicalgia [M54.2]  Left shoulder pain [M25.512]    SUBJECTIVE  Pain Level (0-10 scale): 2  Any medication changes, allergies to medications, adverse drug reactions, diagnosis change, or new procedure performed?: [x] No    [] Yes (see summary sheet for update)  Subjective functional status/changes:   [] No changes reported  \" Little discomfort because I just woke up but overall improvement. I have the most trouble with reaching out to the front/the side and OH. \"     OBJECTIVE    30   min Therapeutic Exercise:  [x] See flow sheet :   Rationale: increase ROM and increase strength to improve the patients ability to perform ADLs with improved shoulder/elbow strength and mobility. 11 min Neuromuscular Re-education:  [x]  See flow sheet :   Rationale: increase ROM, increase strength, improve coordination, improve balance and increase proprioception  to improve the patients ability to perform overhead functional lifts with improved scapular stabilization and neutral cervical posture. With   [x] TE   [] TA   [x] neuro   [] other: Patient Education: [x] Review HEP    [] Progressed/Changed HEP based on:   [] positioning   [] body mechanics   [] transfers   [] heat/ice application    [] other:      Other Objective/Functional Measures:      Pain Level (0-10 scale) post treatment:  0    ASSESSMENT/Changes in Function:   Pt does require constant cueing for correct form during TE due to trunk and UT compensatory patterns. Able to tolerate small PRE progressions without sx exacerbation.      Patient will continue to benefit from skilled PT services to modify and progress therapeutic interventions, address functional mobility deficits, address ROM deficits, address strength deficits, analyze and address soft tissue restrictions, analyze and cue movement patterns, analyze and modify body mechanics/ergonomics, assess and modify postural abnormalities, address imbalance/dizziness and instruct in home and community integration to attain remaining goals. []  See Plan of Care  []  See progress note/recertification  []  See Discharge Summary         Progress towards goals / Updated goals:  1. Goal: Pt to be compliant with initial HEP to improve shoulder range of motion and pain. Status at last note/certification: Established and reviewed with Pt  Current: met, pt reports good compliance (2/10/22)   2) Goal: Pt to demonstrate 5/5 shoulder flexion MMT of left shoulder to improve overhead lifting ability   Status at last note/certification: 4-/5 shoulder flexion MMT   Current: progressed TE to tolerance today  (2/17/22)  3)  Goal: Pt to demonstrate at least 160 deg of B shoulder flexion AROM in seated position to facilitate ease of donning/doffing clothes, overhead lifting, and grooming tasks. 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 Status at last note/certification: 20 seconds  Current : progressing, 24\" (3/1/22)     PLAN  [x]  Upgrade activities as tolerated     [x]  Continue plan of care  []  Update interventions per flow sheet       []  Discharge due to:_  [x]  Other:_  PN needed NV.      Lefty Christensen PTA 3/8/2022  11:27 AM    Future Appointments   Date Time Provider Buddy Pairs   3/8/2022 10:45 AM Shad Grier PTA [facility]

## 2022-03-22 ENCOUNTER — APPOINTMENT (OUTPATIENT)
Dept: PHYSICAL THERAPY | Age: 52
End: 2022-03-22
Payer: OTHER GOVERNMENT

## 2022-04-05 NOTE — PROGRESS NOTES
[facility] AT [address]. [place], [place], [place] [address]  Phone: [phone] Fax [phone] SUMMARY  Patient Name: Mortimer Sour :    Treatment/Medical Diagnosis: Cervicalgia [M54.2]  Left shoulder pain [M25.512]   Referral Source: Lalito Abreu MD     Date of Initial Visit: 22 Attended Visits: 11 Missed Visits: 3     SUMMARY OF TREATMENT  Pt is a pleasant 54 y.o. female who presents with c/o neck pain and left shoulder pain/weakness. The patient reports an extensive surgical history in the past 6 months impacting her condition. Ther ex including strengthening, ROM, flexibility, stabilization; TherAct for OH reaching; stability training; moist heat as needed; postural education; Patient education; HEP  CURRENT STATUS  The pt attended 11 sessions for treatment of neck pain and left shoulder weakness. The pt is being discharged at this time due to violation of clinic's attendance policy. At time of last session, pt demonstrating improved left shoulder strength, endurance, and functional range of motion. She will be discharged without further assessment. 1. Goal: Pt to be compliant with initial HEP to improve shoulder range of motion and pain. Status at last note/certification: Established and reviewed with Pt  Current: met, pt reports good compliance (2/10/22)   2) Goal: Pt to demonstrate 5/5 shoulder flexion MMT of left shoulder to improve overhead lifting ability   Status at last note/certification: 4-/5 shoulder flexion MMT   Current: progressed TE to tolerance today  (22)  3)  Goal: Pt to demonstrate at least 160 deg of B shoulder flexion AROM in seated position to facilitate ease of donning/doffing clothes, overhead lifting, and grooming tasks.   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 Status at last note/certification: 20 seconds  Current : progressing, 24\" (3/1/22)     RECOMMENDATIONS  Discontinue therapy due to lack of attendance or compliance. If you have any questions/comments please contact us directly at [phone]. Thank you for allowing us to assist in the care of your patient.   Therapist Signature: Lynne Mann Date: 4/5/22   Reporting Period: 2/11/22-3/8/22 Time: 2:40 PM

## (undated) DEVICE — FLUORESCENCE IMAGING PROCEDURE KIT: Brand: FIREFLY

## (undated) DEVICE — VISUALIZATION SYSTEM: Brand: CLEARIFY

## (undated) DEVICE — SURGICAL GOWN, XL SMARTSLEEVE: Brand: CONVERTORS

## (undated) DEVICE — SEAL UNIV 5-8MM DISP BX/10 -- DA VINCI XI - SNGL USE

## (undated) DEVICE — SUTURE GOR TX SZ 0 L36IN NONABSORBABLE L26MM TH-26 1/2 CIR 2N01A

## (undated) DEVICE — SUTURE ABSRB 30IN UD 2 0 V 20 26MM 1 2 CIR TAPR PNT NDL MFIL GC123

## (undated) DEVICE — CATH FOL TY IC BAG 16FR 2000ML -- CONVERT TO ITEM 363158

## (undated) DEVICE — SUTURE SZ 0 27IN 5/8 CIR UR-6  TAPER PT VIOLET ABSRB VICRYL J603H

## (undated) DEVICE — INFECTION CONTROL KIT SYS

## (undated) DEVICE — DRAPE KIT ACCS 4-ARM DISP -- DA VINCI

## (undated) DEVICE — ELECTRO LUBE IS A SINGLE PATIENT USE DEVICE THAT IS INTENDED TO BE USED ON ELECTROSURGICAL ELECTRODES TO REDUCE STICKING.: Brand: KEY SURGICAL ELECTRO LUBE

## (undated) DEVICE — SURGICAL PROCEDURE PACK BASIN MAJ SET CUST NO CAUT

## (undated) DEVICE — PAD 05IN BASE 3IN PEAK M DENS CONVOLUTED FOAM

## (undated) DEVICE — SOLUTION IV 1000ML 0.9% SOD CHL

## (undated) DEVICE — OBTRTR BLDELSS 8MM DISP -- DA VINCI - SNGL USE

## (undated) DEVICE — GAUZE PK VAG XR DTECT STERIL 2INX9FT

## (undated) DEVICE — Device

## (undated) DEVICE — HANDLE LT SNAP ON ULT DURABLE LENS FOR TRUMPF ALC DISPOSABLE

## (undated) DEVICE — REM POLYHESIVE ADULT PATIENT RETURN ELECTRODE: Brand: VALLEYLAB

## (undated) DEVICE — BASIC PACK: Brand: CONVERTORS

## (undated) DEVICE — TUBING FLTR PLUME AWAY EVAC W/ SUCT DEV DISP PUREVIEW

## (undated) DEVICE — DISPOSABLE GRASPER: Brand: EPIX LAPAROSCOPIC GRASPER

## (undated) DEVICE — NEEDLE HYPO 25GA L1.5IN BVL ORIENTED ECLIPSE

## (undated) DEVICE — DEVON™ KNEE AND BODY STRAP 60" X 3" (1.5 M X 7.6 CM): Brand: DEVON

## (undated) DEVICE — ROCKER SWITCH PENCIL BLADE ELECTRODE, HOLSTER: Brand: EDGE

## (undated) DEVICE — BARRIER TISS ADH ABSRB 3X4IN -- GYNECARE INTERCEED

## (undated) DEVICE — SYRINGE CATH TIP 50ML

## (undated) DEVICE — DRAPE,REIN 53X77,STERILE: Brand: MEDLINE

## (undated) DEVICE — (D)SYR 10ML 1/5ML GRAD NSAF -- PKGING CHANGE USE ITEM 338027

## (undated) DEVICE — 1200 GUARD II KIT W/5MM TUBE W/O VAC TUBE: Brand: GUARDIAN

## (undated) DEVICE — Z INACTIVE USE 2527070 DRAPE SURG W40XL44IN UNDERBUTTOCK SMS POLYPR W/ PCH BK DISP

## (undated) DEVICE — SYR 10ML CTRL LR LCK NSAF LF --

## (undated) DEVICE — NEEDLE INSUF L120MM DIA2MM DISP FOR PNEUMOPERI ENDOPATH

## (undated) DEVICE — PERI/GYN PACK: Brand: CONVERTORS

## (undated) DEVICE — CYSTO/BLADDER IRRIGATION SET, REGULATING CLAMP

## (undated) DEVICE — KENDALL SCD EXPRESS SLEEVES, KNEE LENGTH, MEDIUM: Brand: KENDALL SCD

## (undated) DEVICE — STERILE POLYISOPRENE POWDER-FREE SURGICAL GLOVES: Brand: PROTEXIS

## (undated) DEVICE — (D)PREP SKN CHLRAPRP APPL 26ML -- CONVERT TO ITEM 371833

## (undated) DEVICE — PAD SANIT NPKN 4IN GRD

## (undated) DEVICE — TRAY PREP DRY W/ PREM GLV 2 APPL 6 SPNG 2 UNDPD 1 OVERWRAP

## (undated) DEVICE — SUTURE VCRL SZ 0 L27IN ABSRB UD SH L26MM 1/2 CIR TAPERPOINT J418H

## (undated) DEVICE — CANNULA SEAL

## (undated) DEVICE — TIP COVER ACCESSORY

## (undated) DEVICE — DERMABOND SKIN ADH 0.7ML -- DERMABOND ADVANCED 12/BX

## (undated) DEVICE — SURGICAL PROCEDURE PACK GYN LAPAROSCOPY CUST SMH LF